# Patient Record
Sex: MALE | Race: OTHER | HISPANIC OR LATINO | URBAN - METROPOLITAN AREA
[De-identification: names, ages, dates, MRNs, and addresses within clinical notes are randomized per-mention and may not be internally consistent; named-entity substitution may affect disease eponyms.]

---

## 2019-03-21 ENCOUNTER — EMERGENCY (EMERGENCY)
Facility: HOSPITAL | Age: 64
LOS: 1 days | Discharge: HOME | End: 2019-03-21
Attending: EMERGENCY MEDICINE | Admitting: EMERGENCY MEDICINE
Payer: MEDICAID

## 2019-03-21 VITALS
HEART RATE: 62 BPM | DIASTOLIC BLOOD PRESSURE: 71 MMHG | RESPIRATION RATE: 18 BRPM | TEMPERATURE: 98 F | OXYGEN SATURATION: 98 % | SYSTOLIC BLOOD PRESSURE: 142 MMHG

## 2019-03-21 DIAGNOSIS — H11.31 CONJUNCTIVAL HEMORRHAGE, RIGHT EYE: ICD-10-CM

## 2019-03-21 DIAGNOSIS — R07.89 OTHER CHEST PAIN: ICD-10-CM

## 2019-03-21 DIAGNOSIS — R23.4 CHANGES IN SKIN TEXTURE: ICD-10-CM

## 2019-03-21 DIAGNOSIS — I10 ESSENTIAL (PRIMARY) HYPERTENSION: ICD-10-CM

## 2019-03-21 DIAGNOSIS — R51 HEADACHE: ICD-10-CM

## 2019-03-21 DIAGNOSIS — Z79.899 OTHER LONG TERM (CURRENT) DRUG THERAPY: ICD-10-CM

## 2019-03-21 LAB
ALBUMIN SERPL ELPH-MCNC: 3.3 G/DL — LOW (ref 3.4–5)
ALP SERPL-CCNC: 102 U/L — SIGNIFICANT CHANGE UP (ref 40–120)
ALT FLD-CCNC: 24 U/L — SIGNIFICANT CHANGE UP (ref 12–42)
ANION GAP SERPL CALC-SCNC: 7 MMOL/L — LOW (ref 9–16)
APPEARANCE UR: CLEAR — SIGNIFICANT CHANGE UP
AST SERPL-CCNC: 24 U/L — SIGNIFICANT CHANGE UP (ref 15–37)
BASOPHILS NFR BLD AUTO: 0.3 % — SIGNIFICANT CHANGE UP (ref 0–2)
BILIRUB SERPL-MCNC: 0.3 MG/DL — SIGNIFICANT CHANGE UP (ref 0.2–1.2)
BILIRUB UR-MCNC: NEGATIVE — SIGNIFICANT CHANGE UP
BUN SERPL-MCNC: 13 MG/DL — SIGNIFICANT CHANGE UP (ref 7–23)
CALCIUM SERPL-MCNC: 8.5 MG/DL — SIGNIFICANT CHANGE UP (ref 8.5–10.5)
CHLORIDE SERPL-SCNC: 106 MMOL/L — SIGNIFICANT CHANGE UP (ref 96–108)
CO2 SERPL-SCNC: 28 MMOL/L — SIGNIFICANT CHANGE UP (ref 22–31)
COLOR SPEC: YELLOW — SIGNIFICANT CHANGE UP
CREAT SERPL-MCNC: 1.05 MG/DL — SIGNIFICANT CHANGE UP (ref 0.5–1.3)
DIFF PNL FLD: NEGATIVE — SIGNIFICANT CHANGE UP
EOSINOPHIL NFR BLD AUTO: 1.3 % — SIGNIFICANT CHANGE UP (ref 0–6)
GLUCOSE SERPL-MCNC: 84 MG/DL — SIGNIFICANT CHANGE UP (ref 70–99)
GLUCOSE UR QL: NEGATIVE — SIGNIFICANT CHANGE UP
HCT VFR BLD CALC: 45.7 % — SIGNIFICANT CHANGE UP (ref 39–50)
HGB BLD-MCNC: 14.2 G/DL — SIGNIFICANT CHANGE UP (ref 13–17)
IMM GRANULOCYTES NFR BLD AUTO: 0.2 % — SIGNIFICANT CHANGE UP (ref 0–1.5)
KETONES UR-MCNC: NEGATIVE — SIGNIFICANT CHANGE UP
LEUKOCYTE ESTERASE UR-ACNC: NEGATIVE — SIGNIFICANT CHANGE UP
LIDOCAIN IGE QN: 98 U/L — SIGNIFICANT CHANGE UP (ref 73–393)
LYMPHOCYTES # BLD AUTO: 37.3 % — SIGNIFICANT CHANGE UP (ref 13–44)
MCHC RBC-ENTMCNC: 26.5 PG — LOW (ref 27–34)
MCHC RBC-ENTMCNC: 31.1 G/DL — LOW (ref 32–36)
MCV RBC AUTO: 85.4 FL — SIGNIFICANT CHANGE UP (ref 80–100)
MONOCYTES NFR BLD AUTO: 7.5 % — SIGNIFICANT CHANGE UP (ref 2–14)
NEUTROPHILS NFR BLD AUTO: 53.4 % — SIGNIFICANT CHANGE UP (ref 43–77)
NITRITE UR-MCNC: NEGATIVE — SIGNIFICANT CHANGE UP
PH UR: 7.5 — SIGNIFICANT CHANGE UP (ref 5–8)
PLATELET # BLD AUTO: 253 K/UL — SIGNIFICANT CHANGE UP (ref 150–400)
POTASSIUM SERPL-MCNC: 4.1 MMOL/L — SIGNIFICANT CHANGE UP (ref 3.5–5.3)
POTASSIUM SERPL-SCNC: 4.1 MMOL/L — SIGNIFICANT CHANGE UP (ref 3.5–5.3)
PROT SERPL-MCNC: 7.8 G/DL — SIGNIFICANT CHANGE UP (ref 6.4–8.2)
PROT UR-MCNC: NEGATIVE MG/DL — SIGNIFICANT CHANGE UP
RBC # BLD: 5.35 M/UL — SIGNIFICANT CHANGE UP (ref 4.2–5.8)
RBC # FLD: 13.5 % — SIGNIFICANT CHANGE UP (ref 10.3–14.5)
SODIUM SERPL-SCNC: 141 MMOL/L — SIGNIFICANT CHANGE UP (ref 132–145)
SP GR SPEC: 1.01 — SIGNIFICANT CHANGE UP (ref 1–1.03)
TROPONIN I SERPL-MCNC: <0.017 NG/ML — LOW (ref 0.02–0.06)
UROBILINOGEN FLD QL: 0.2 E.U./DL — SIGNIFICANT CHANGE UP
WBC # BLD: 6 K/UL — SIGNIFICANT CHANGE UP (ref 3.8–10.5)
WBC # FLD AUTO: 6 K/UL — SIGNIFICANT CHANGE UP (ref 3.8–10.5)

## 2019-03-21 PROCEDURE — 99284 EMERGENCY DEPT VISIT MOD MDM: CPT | Mod: 25

## 2019-03-21 PROCEDURE — 70450 CT HEAD/BRAIN W/O DYE: CPT | Mod: 26

## 2019-03-21 PROCEDURE — 93010 ELECTROCARDIOGRAM REPORT: CPT

## 2019-03-21 NOTE — ED ADULT NURSE NOTE - NSIMPLEMENTINTERV_GEN_ALL_ED
Implemented All Universal Safety Interventions:  [place] to call system. Call bell, personal items and telephone within reach. Instruct patient to call for assistance. Room bathroom lighting operational. Non-slip footwear when patient is off stretcher. Physically safe environment: no spills, clutter or unnecessary equipment. Stretcher in lowest position, wheels locked, appropriate side rails in place.

## 2019-03-21 NOTE — ED PROVIDER NOTE - PROGRESS NOTE DETAILS
[facility] - discussed results with patient. will dc home with close follow up. patient aware and agrees.

## 2019-03-21 NOTE — ED PROVIDER NOTE - CLINICAL SUMMARY MEDICAL DECISION MAKING FREE TEXT BOX
64 y/o male with h/o HTN, presents with chest pressure and concern for high BP and HA. Pt appears well, unlikely ACS or ICH, however will send labs, troponin, CT head, EKG, and reassess. If normal workup, pt safe for outpatient follow-up in 3 days. Patient aware and agrees.

## 2019-03-21 NOTE — ED PROVIDER NOTE - SKIN, MLM
Left anterior tibia with 3cm area of dry scaly skin. Otherwise skin normal color for race, warm, dry and intact.

## 2019-03-21 NOTE — ED PROVIDER NOTE - ATTENDING CONTRIBUTION TO CARE
63 yom pw htn, hx of htn, recently increased norvasc from 5 to 10, still not well controlled, noted subconj hemorrhage, had seen by ophthalmologist as well.  states last night had chest heaviness, also headache.  no back pain, no nv, no sob.  no paresthesia/weakness.    agree w/ resident, ao x 3, clear speech, full eomi, perrl, rrr, cta bl, will check screening ekg, ct head given pt w/ htn w/ ha and on asa, labs eval for renal function vs ua for protein, reassess    instructed pt to f/u pmd regarding possibly adding 2nd agent for htn

## 2019-03-21 NOTE — ED PROVIDER NOTE - OBJECTIVE STATEMENT
62 y/o male with PMHx of HTN (takes amlodipine), NKDA, presents to the ED c/o high BP and headache. He reports taking his BP at home at 3am this morning, pressure recorded at 188/94. Pt notes his PCP recently increased his dose of amlodipine from 5mg to 10mg to further control his BP, but the change had minimal improvement. He admits to chest pressure, HA, and 3 weeks of right eye redness. Pt saw an ophthalmologist 2 wks ago for his eye redness where his eval was normal and was given eyedrops. He denies blurry vision, no diplopia, no weakness, no sob, no CP. To note, a virtual  was used to obtain history,  ID#[id number].

## 2019-03-21 NOTE — ED PROVIDER NOTE - NSFOLLOWUPINSTRUCTIONS_ED_ALL_ED_FT
1) Follow up with your doctor in 1 week. Call for an appointment.   2) Return to the ER immediately for new or worsening symptoms including chest pain, weakness or other issues.   3) Take all your home medications as prescribed.   4) Talk to your doctor about starting another medicine - hydrochlorothiazide.

## 2019-03-21 NOTE — ED ADULT TRIAGE NOTE - CHIEF COMPLAINT QUOTE
pt states his home BP machine has been reading high, 187/94. takes asa and amlodipine.  States he has a small headache and right eye has burst capillary x 3 weeks.

## 2019-04-03 ENCOUNTER — APPOINTMENT (OUTPATIENT)
Dept: UROLOGY | Facility: CLINIC | Age: 64
End: 2019-04-03

## 2019-11-16 ENCOUNTER — EMERGENCY (EMERGENCY)
Facility: HOSPITAL | Age: 64
LOS: 1 days | Discharge: HOME | End: 2019-11-16
Admitting: EMERGENCY MEDICINE
Payer: MEDICAID

## 2019-11-16 VITALS
TEMPERATURE: 98 F | HEIGHT: 69 IN | OXYGEN SATURATION: 98 % | SYSTOLIC BLOOD PRESSURE: 160 MMHG | HEART RATE: 60 BPM | WEIGHT: 190.04 LBS | RESPIRATION RATE: 16 BRPM | DIASTOLIC BLOOD PRESSURE: 84 MMHG

## 2019-11-16 PROCEDURE — 99283 EMERGENCY DEPT VISIT LOW MDM: CPT

## 2019-11-16 PROCEDURE — 73660 X-RAY EXAM OF TOE(S): CPT | Mod: 26,LT

## 2019-11-16 RX ORDER — CEPHALEXIN 500 MG
1 CAPSULE ORAL
Qty: 28 | Refills: 0
Start: 2019-11-16 | End: 2019-11-22

## 2019-11-16 NOTE — ED ADULT TRIAGE NOTE - CHIEF COMPLAINT QUOTE
ambulatory accompanied by daughter complaining of pain and black discoloration to left fifth toe. States he wants to make sure it is not infected. Pt is Samoan speaking.

## 2019-11-16 NOTE — ED PROVIDER NOTE - PATIENT PORTAL LINK FT
You can access the FollowMyHealth Patient Portal offered by [facility] by registering at the following website: [email or web address]. By joining [employer]’s FollowMyHealth portal, you will also be able to view your health information using other applications (apps) compatible with our system.

## 2019-11-16 NOTE — ED PROVIDER NOTE - NSFOLLOWUPINSTRUCTIONS_ED_ALL_ED_FT
PLEASE TAKE 1 TAB CEPHALEXIN 500MG BY MOUTH FOUR TIMES A DAY FOR 7 DAYS.    PLEASE FOLLOW UP WITH EITHER DR. ADAME (PODIATRIST) OR WITH THE PODIATRY CLINIC LISTED HERE WITHIN 1-3 DAYS AS DISCUSSED.    PLEASE RETURN TO THE ER IMMEDIATELY OR CALL 911 FOR ANY HIGH FEVER, CHILLS, CHEST PAIN, TROUBLE BREATHING, SEVERE PAIN, TOE NUMBNESS, TOE WEAKNESS, BLEEDING, DISCHARGE, FOUL ODOR, OR ANY OTHER CONCERNING SIGNS OR SYMPTOMS.

## 2019-11-16 NOTE — ED PROVIDER NOTE - SKIN WOUND TYPE
+dry, blackened gangrene to the distal tip of the left 5th toe. Nontender. No swelling, erythema, warmth, streaking, fluctuance, crepitus, bullae, open wounds, bleeding or discharge. +dry, darkened skin discoloration to the distal tip of the left 5th toe resembling probable ecchymosis, less likely dry gangrene. Nontender. No swelling, erythema, warmth, streaking, fluctuance, crepitus, bullae, open wounds, bleeding or discharge.

## 2019-11-16 NOTE — ED ADULT NURSE NOTE - OBJECTIVE STATEMENT
65 yo M c/o change in toe color. Pt denies pain, states that he noticed his toenail looked black and he was unsure why. Pt is unsure of trauma, states he may have dropped a box on his toe at work but does not remember. Pt is asymptomatic at this time. Hx of htn taking losartan, denies all other hx. Pt speaking in full complete sentences, resting comfortably in NAD with family at bedside.

## 2019-11-16 NOTE — ED PROVIDER NOTE - CARE PROVIDER_API CALL
Young Trujillo (DPM)  Podiatric Medicine and Surgery  [address], [address]  [place] [address]  Phone: [phone]  Fax: [phone]  Follow Up Time: 1-3 Days

## 2019-11-16 NOTE — ED ADULT NURSE NOTE - CHIEF COMPLAINT QUOTE
ambulatory accompanied by daughter complaining of pain and black discoloration to left fifth toe. States he wants to make sure it is not infected. Pt is [place] Island[place] speaking.

## 2019-11-16 NOTE — ED ADULT NURSE NOTE - CHPI ED NUR SYMPTOMS NEG
no nausea/no tingling/no dizziness/no fever/no chills/no decreased eating/drinking/no vomiting/no weakness/no pain

## 2019-11-16 NOTE — ED ADULT NURSE NOTE - NSIMPLEMENTINTERV_GEN_ALL_ED
Implemented All Universal Safety Interventions:  [place] to call system. Call bell, personal items and telephone within reach. Instruct patient to call for assistance. Room bathroom lighting operational. Non-slip footwear when patient is off stretcher. Physically safe environment: no spills, clutter or unnecessary equipment. Stretcher in lowest position, wheels locked, appropriate side rails in place.

## 2019-11-16 NOTE — ED PROVIDER NOTE - OBJECTIVE STATEMENT
65 y/o M with PMH of HTN presents to the ED c/o discoloration to his left 5th toe which he first noticed today. 63 y/o M with PMH of HTN presents to the ED c/o black discoloration to his left 5th toe which he first noticed today, though states he is unsure how long the toe has been discolored. He denies having any pain to the toe, despite the triage note, and states he has no other associated sx's or acute medical complaints at this time. He denies any recent injury. No acute numbness or weakness. No fever, chills, foot pain, foul odor, bleeding or discharge from the wound, His accompanying daughter states the pt had an unspecified infection of his entire LLE 3 years ago which required "surgery to remove the infection" at another unknown ED 3 years ago.

## 2019-11-16 NOTE — ED PROVIDER NOTE - CLINICAL SUMMARY MEDICAL DECISION MAKING FREE TEXT BOX
Clinical presentation is c/w probably ecchymosis of left 5th toe; less likely dry gangrene, not acute. No fever, chills or other associated sx's. No acute, concerning findings on xrays. Will prescribe Keflex and have pt F/U with Podiatry in 1-3 days. Strict return precautions reviewed with pt in which pt verbalizes understanding and agrees to.

## 2019-11-16 NOTE — ED PROVIDER NOTE - NSFOLLOWUPCLINICS_GEN_ALL_ED_FT
[facility]  Podiatry  [address] [place] [address]  Phone: [phone]  Fax:   Follow Up Time: 1-3 Days

## 2019-11-21 DIAGNOSIS — M79.675 PAIN IN LEFT TOE(S): ICD-10-CM

## 2019-11-21 DIAGNOSIS — M79.81 NONTRAUMATIC HEMATOMA OF SOFT TISSUE: ICD-10-CM

## 2020-01-14 ENCOUNTER — EMERGENCY (EMERGENCY)
Facility: HOSPITAL | Age: 65
LOS: 1 days | Discharge: HOME | End: 2020-01-14
Admitting: EMERGENCY MEDICINE
Payer: MEDICAID

## 2020-01-14 VITALS
HEART RATE: 53 BPM | SYSTOLIC BLOOD PRESSURE: 148 MMHG | TEMPERATURE: 98 F | RESPIRATION RATE: 16 BRPM | DIASTOLIC BLOOD PRESSURE: 79 MMHG | OXYGEN SATURATION: 100 %

## 2020-01-14 VITALS
HEART RATE: 56 BPM | OXYGEN SATURATION: 98 % | TEMPERATURE: 97 F | HEIGHT: 68 IN | DIASTOLIC BLOOD PRESSURE: 72 MMHG | WEIGHT: 192.02 LBS | SYSTOLIC BLOOD PRESSURE: 128 MMHG | RESPIRATION RATE: 17 BRPM

## 2020-01-14 LAB
ALBUMIN SERPL ELPH-MCNC: 3.5 G/DL — SIGNIFICANT CHANGE UP (ref 3.4–5)
ALP SERPL-CCNC: 115 U/L — SIGNIFICANT CHANGE UP (ref 40–120)
ALT FLD-CCNC: 27 U/L — SIGNIFICANT CHANGE UP (ref 12–42)
ANION GAP SERPL CALC-SCNC: 7 MMOL/L — LOW (ref 9–16)
APPEARANCE UR: CLEAR — SIGNIFICANT CHANGE UP
AST SERPL-CCNC: 27 U/L — SIGNIFICANT CHANGE UP (ref 15–37)
BASOPHILS # BLD AUTO: 0.04 K/UL — SIGNIFICANT CHANGE UP (ref 0–0.2)
BASOPHILS NFR BLD AUTO: 0.5 % — SIGNIFICANT CHANGE UP (ref 0–2)
BILIRUB SERPL-MCNC: 0.2 MG/DL — SIGNIFICANT CHANGE UP (ref 0.2–1.2)
BILIRUB UR-MCNC: NEGATIVE — SIGNIFICANT CHANGE UP
BUN SERPL-MCNC: 15 MG/DL — SIGNIFICANT CHANGE UP (ref 7–23)
CALCIUM SERPL-MCNC: 8.7 MG/DL — SIGNIFICANT CHANGE UP (ref 8.5–10.5)
CHLORIDE SERPL-SCNC: 104 MMOL/L — SIGNIFICANT CHANGE UP (ref 96–108)
CO2 SERPL-SCNC: 28 MMOL/L — SIGNIFICANT CHANGE UP (ref 22–31)
COLOR SPEC: YELLOW — SIGNIFICANT CHANGE UP
CREAT SERPL-MCNC: 1.05 MG/DL — SIGNIFICANT CHANGE UP (ref 0.5–1.3)
DIFF PNL FLD: NEGATIVE — SIGNIFICANT CHANGE UP
EOSINOPHIL # BLD AUTO: 0.11 K/UL — SIGNIFICANT CHANGE UP (ref 0–0.5)
EOSINOPHIL NFR BLD AUTO: 1.4 % — SIGNIFICANT CHANGE UP (ref 0–6)
GLUCOSE SERPL-MCNC: 119 MG/DL — HIGH (ref 70–99)
GLUCOSE UR QL: NEGATIVE — SIGNIFICANT CHANGE UP
HCT VFR BLD CALC: 44.3 % — SIGNIFICANT CHANGE UP (ref 39–50)
HGB BLD-MCNC: 14 G/DL — SIGNIFICANT CHANGE UP (ref 13–17)
IMM GRANULOCYTES NFR BLD AUTO: 0.3 % — SIGNIFICANT CHANGE UP (ref 0–1.5)
KETONES UR-MCNC: NEGATIVE — SIGNIFICANT CHANGE UP
LEUKOCYTE ESTERASE UR-ACNC: NEGATIVE — SIGNIFICANT CHANGE UP
LYMPHOCYTES # BLD AUTO: 3.05 K/UL — SIGNIFICANT CHANGE UP (ref 1–3.3)
LYMPHOCYTES # BLD AUTO: 39.2 % — SIGNIFICANT CHANGE UP (ref 13–44)
MAGNESIUM SERPL-MCNC: 2 MG/DL — SIGNIFICANT CHANGE UP (ref 1.6–2.6)
MCHC RBC-ENTMCNC: 26.6 PG — LOW (ref 27–34)
MCHC RBC-ENTMCNC: 31.6 GM/DL — LOW (ref 32–36)
MCV RBC AUTO: 84.2 FL — SIGNIFICANT CHANGE UP (ref 80–100)
MONOCYTES # BLD AUTO: 0.51 K/UL — SIGNIFICANT CHANGE UP (ref 0–0.9)
MONOCYTES NFR BLD AUTO: 6.5 % — SIGNIFICANT CHANGE UP (ref 2–14)
NEUTROPHILS # BLD AUTO: 4.06 K/UL — SIGNIFICANT CHANGE UP (ref 1.8–7.4)
NEUTROPHILS NFR BLD AUTO: 52.1 % — SIGNIFICANT CHANGE UP (ref 43–77)
NITRITE UR-MCNC: NEGATIVE — SIGNIFICANT CHANGE UP
NRBC # BLD: 0 /100 WBCS — SIGNIFICANT CHANGE UP (ref 0–0)
PH UR: 7 — SIGNIFICANT CHANGE UP (ref 5–8)
PLATELET # BLD AUTO: 260 K/UL — SIGNIFICANT CHANGE UP (ref 150–400)
POTASSIUM SERPL-MCNC: 3.8 MMOL/L — SIGNIFICANT CHANGE UP (ref 3.5–5.3)
POTASSIUM SERPL-SCNC: 3.8 MMOL/L — SIGNIFICANT CHANGE UP (ref 3.5–5.3)
PROT SERPL-MCNC: 8 G/DL — SIGNIFICANT CHANGE UP (ref 6.4–8.2)
PROT UR-MCNC: NEGATIVE MG/DL — SIGNIFICANT CHANGE UP
RBC # BLD: 5.26 M/UL — SIGNIFICANT CHANGE UP (ref 4.2–5.8)
RBC # FLD: 13.9 % — SIGNIFICANT CHANGE UP (ref 10.3–14.5)
SODIUM SERPL-SCNC: 139 MMOL/L — SIGNIFICANT CHANGE UP (ref 132–145)
SP GR SPEC: 1.02 — SIGNIFICANT CHANGE UP (ref 1–1.03)
TROPONIN I SERPL-MCNC: <0.017 NG/ML — LOW (ref 0.02–0.06)
UROBILINOGEN FLD QL: 0.2 E.U./DL — SIGNIFICANT CHANGE UP
WBC # BLD: 7.79 K/UL — SIGNIFICANT CHANGE UP (ref 3.8–10.5)
WBC # FLD AUTO: 7.79 K/UL — SIGNIFICANT CHANGE UP (ref 3.8–10.5)

## 2020-01-14 PROCEDURE — 70450 CT HEAD/BRAIN W/O DYE: CPT | Mod: 26

## 2020-01-14 PROCEDURE — 93010 ELECTROCARDIOGRAM REPORT: CPT

## 2020-01-14 PROCEDURE — 99284 EMERGENCY DEPT VISIT MOD MDM: CPT

## 2020-01-14 RX ORDER — ACETAMINOPHEN 500 MG
650 TABLET ORAL ONCE
Refills: 0 | Status: COMPLETED | OUTPATIENT
Start: 2020-01-14 | End: 2020-01-14

## 2020-01-14 RX ADMIN — Medication 650 MILLIGRAM(S): at 19:30

## 2020-01-14 NOTE — ED PROVIDER NOTE - PHYSICAL EXAMINATION
VITAL SIGNS: I have reviewed nursing notes and confirm.  CONSTITUTIONAL: Well-developed; well-nourished; in no acute distress.  SKIN: Skin is warm and dry, no acute rash.  HEAD: Normocephalic; atraumatic.  EYES: PERRL, EOM intact; conjunctiva and sclera clear.  ENT: No nasal discharge; airway clear.  NECK: Supple; non tender.  CARD: No murmurs, gallops, or rubs. Regular rate and rhythm.  RESP: No wheezes, rales or rhonchi.  EXT: Normal ROM. No clubbing, cyanosis or edema.  NEURO: Alert, oriented. Grossly unremarkable.  PSYCH: Cooperative, appropriate.

## 2020-01-14 NOTE — ED ADULT TRIAGE NOTE - CHIEF COMPLAINT QUOTE
Pt presents to ED with c/o high blood pressure with accompanying headache x 2 weeks, denies dizziness or chest pressure, took amlodipine - /72 now, CN II-XII grossly intact

## 2020-01-14 NOTE — ED ADULT NURSE NOTE - NSIMPLEMENTINTERV_GEN_ALL_ED
Implemented All Universal Safety Interventions:  [place] to call system. Call bell, personal items and telephone within reach. Instruct patient to call for assistance. Room bathroom lighting operational. Non-slip footwear when patient is off stretcher. Physically safe environment: no spills, clutter or unnecessary equipment. Stretcher in lowest position, wheels locked, appropriate side rails in place.

## 2020-01-14 NOTE — ED PROVIDER NOTE - NS ED ROS FT
Other than symptoms associated with present events the following is reported:  General:  No fever, no chills, no weight loss.  HEENT:  No sore throat.  Respiratory: No cough, no dyspnea, no wheeze.  Cardiovascular:  No chest pain, no palpitations, no orthopnea.  GI: No abdominal pain, no nausea/vomiting, no diarrhea.  Musculoskeletal:  No joint pain, no myalgia.  Endocrine:  No generalized weakness, no polyuria.  Neurological:  +Headache, no focal weakness.   Psychiatric: No emotional stress, no depression.  Derm:  No rash.  Heme:  No bruising, no bleeding.

## 2020-01-14 NOTE — ED ADULT NURSE NOTE - CHPI ED NUR SYMPTOMS NEG
no change in level of consciousness/no blurred vision/no confusion/no loss of consciousness/no vomiting/no weakness/no dizziness/no fever/no nausea/no numbness

## 2020-01-14 NOTE — ED PROVIDER NOTE - PATIENT PORTAL LINK FT
You can access the FollowMyHealth Patient Portal offered by [facility] by registering at the following website: [email or web address]. By joining [employer]’s FollowMyHealth portal, you will also be able to view your health information using other applications (apps) compatible with our system.

## 2020-01-14 NOTE — ED PROVIDER NOTE - PROGRESS NOTE DETAILS
labs and CT reviewed. no further symptoms.  headache resolved. discussed hydration. neuro intact AFVSS at time of d/c, pt non-toxic appearing, results, ddx, and f/u plans discussed with pt at bedside, d/c'd home to f/u with PMD, strict return precautions discussed, prompt return to ER for any worsening or new sx, pt verbalized understanding.

## 2020-01-14 NOTE — ED ADULT NURSE NOTE - OBJECTIVE STATEMENT
65 yo M c.o headache. Pt states "I have had a headache for 2 weeks that is on and off and feels like it is pulsing". Pt states pain is in the frontal and occipital portion of head. Pt states he is compliant with BP medication and takes in every morning as directed. Pt is A&Ox3 at this time denies lightheadedness, dizziness, blurred vision, numbness or tingling to bl upper and lower extremities, nausea, vomiting, fever, chills, CP, cough at this time.

## 2020-01-14 NOTE — ED PROVIDER NOTE - OBJECTIVE STATEMENT
64 y o male with PMHx of HTN (Amlodipine 10mg) presents to ED c/o headache x2 weeks. Last checked his BP three days ago and read 148/95. Pt is compliant with his meds, takes his medication daily. States that his Amlodipine dose was recently increased from 5mg to 10mg about seven months ago. No syncope, paresthesias, chest pain, blurred vision, N/V, or other sx reported.

## 2020-01-20 DIAGNOSIS — R51 HEADACHE: ICD-10-CM

## 2020-03-22 ENCOUNTER — EMERGENCY (EMERGENCY)
Facility: HOSPITAL | Age: 65
LOS: 1 days | Discharge: HOME | End: 2020-03-22
Admitting: EMERGENCY MEDICINE
Payer: MEDICAID

## 2020-03-22 VITALS
RESPIRATION RATE: 15 BRPM | HEIGHT: 67 IN | OXYGEN SATURATION: 97 % | DIASTOLIC BLOOD PRESSURE: 70 MMHG | SYSTOLIC BLOOD PRESSURE: 132 MMHG | WEIGHT: 190.04 LBS | TEMPERATURE: 103 F | HEART RATE: 86 BPM

## 2020-03-22 DIAGNOSIS — R21 RASH AND OTHER NONSPECIFIC SKIN ERUPTION: ICD-10-CM

## 2020-03-22 DIAGNOSIS — Z79.2 LONG TERM (CURRENT) USE OF ANTIBIOTICS: ICD-10-CM

## 2020-03-22 DIAGNOSIS — J06.9 ACUTE UPPER RESPIRATORY INFECTION, UNSPECIFIED: ICD-10-CM

## 2020-03-22 DIAGNOSIS — I10 ESSENTIAL (PRIMARY) HYPERTENSION: ICD-10-CM

## 2020-03-22 DIAGNOSIS — R63.0 ANOREXIA: ICD-10-CM

## 2020-03-22 DIAGNOSIS — R50.9 FEVER, UNSPECIFIED: ICD-10-CM

## 2020-03-22 LAB
FLU A RESULT: SIGNIFICANT CHANGE UP
FLU A RESULT: SIGNIFICANT CHANGE UP
FLUAV AG NPH QL: SIGNIFICANT CHANGE UP
FLUBV AG NPH QL: SIGNIFICANT CHANGE UP
RSV RESULT: SIGNIFICANT CHANGE UP
RSV RNA RESP QL NAA+PROBE: SIGNIFICANT CHANGE UP

## 2020-03-22 PROCEDURE — 99284 EMERGENCY DEPT VISIT MOD MDM: CPT

## 2020-03-22 RX ORDER — ACETAMINOPHEN 500 MG
975 TABLET ORAL ONCE
Refills: 0 | Status: COMPLETED | OUTPATIENT
Start: 2020-03-22 | End: 2020-03-22

## 2020-03-22 RX ADMIN — Medication 975 MILLIGRAM(S): at 19:29

## 2020-03-22 NOTE — ED PROVIDER NOTE - NSFOLLOWUPINSTRUCTIONS_ED_ALL_ED_FT
Hydrate well and rest.    Take Tylenol with food for pain and fever.    Quarantine for 14 days.    Return for shortness of breath and difficulty in breathing.

## 2020-03-22 NOTE — ED PROVIDER NOTE - CLINICAL SUMMARY MEDICAL DECISION MAKING FREE TEXT BOX
63 y/o M presents to ED c/o fever, cough.  Pt well appearing, febrile, normotensive.  Pt tested for COVID19, Influenza and rvp.  Pt advised to quarantine for 14 days and supportive treatment.  Return precautions advised.

## 2020-03-22 NOTE — ED PROVIDER NOTE - OBJECTIVE STATEMENT
fever 5 days  rash last night very itchy initially but the itchiness has since resolved   back pain  htn   1 episode of bandar resolved  dec appetite  no sob, throat pain, cp, vom, bandar sick contact cough   lives with wife and daughter who are well 63 yo M w/ PMHx of HTN presents to the ED c/o fever, decreased appetite x 5 days and rash to back since last night. Pt notes rash was very itchy initially but the itchiness has since resolved; pt also notes 1 episode of diarrhea this morning, which has since resolved. Denies SOB, throat pain, chest pain, nausea, vomiting, diarrhea, cough, or sick contacts. Pt lives with his wife and daughter who are well with no complaints. 63 yo M w/ PMHx of HTN presents to the ED c/o fever, decreased appetite x 5 days and rash to back since last night. Pt notes rash was very itchy initially but the itchiness has since resolved; pt also notes 1 episode of diarrhea this morning, which has since resolved. Denies SOB, throat pain, chest pain, nausea, vomiting, diarrhea, cough, or sick contacts, or travels.  He has been taking Tylenol with relief of fever.  Pt lives with his wife and daughter who are well with no complaints.

## 2020-03-22 NOTE — ED PROVIDER NOTE - PATIENT PORTAL LINK FT
You can access the FollowMyHealth Patient Portal offered by [facility] by registering at the following website: [email or web address]. By joining [employer]’s FollowMyHealth portal, you will also be able to view your health information using other applications (apps) compatible with our system.

## 2020-03-22 NOTE — ED ADULT NURSE NOTE - OBJECTIVE STATEMENT
Patient to ED c/o fever, decreased appetite x 5 days and rash to back since last night. Pt notes rash was very itchy initially but the itchiness has since resolved; pt also notes 1 episode of diarrhea this morning, which has since resolved. Denies SOB, throat pain, chest pain, nausea, vomiting, diarrhea, cough, or sick contacts. Pt lives with his wife and daughter who are well with no complaints.

## 2020-03-23 PROBLEM — I10 ESSENTIAL (PRIMARY) HYPERTENSION: Chronic | Status: ACTIVE | Noted: 2020-01-14

## 2020-03-23 LAB — RAPID RVP RESULT: SIGNIFICANT CHANGE UP

## 2020-03-24 LAB — SARS-COV-2 RNA SPEC QL NAA+PROBE: DETECTED

## 2020-09-02 ENCOUNTER — APPOINTMENT (OUTPATIENT)
Dept: HEART AND VASCULAR | Facility: CLINIC | Age: 65
End: 2020-09-02
Payer: MEDICARE

## 2020-09-02 VITALS — DIASTOLIC BLOOD PRESSURE: 90 MMHG | SYSTOLIC BLOOD PRESSURE: 150 MMHG | HEART RATE: 73 BPM

## 2020-09-02 DIAGNOSIS — I10 ESSENTIAL (PRIMARY) HYPERTENSION: ICD-10-CM

## 2020-09-02 PROCEDURE — 99214 OFFICE O/P EST MOD 30 MIN: CPT

## 2020-09-02 RX ORDER — BLOOD-GLUCOSE METER
KIT MISCELLANEOUS
Qty: 1 | Refills: 0 | Status: ACTIVE | COMMUNITY
Start: 2020-09-02 | End: 1900-01-01

## 2020-09-02 RX ORDER — HYDROCHLOROTHIAZIDE 12.5 MG/1
12.5 CAPSULE ORAL DAILY
Qty: 30 | Refills: 3 | Status: ACTIVE | COMMUNITY
Start: 2020-09-02 | End: 1900-01-01

## 2020-09-02 NOTE — HISTORY OF PRESENT ILLNESS
[FreeTextEntry1] : 65-year-old man with past medical history of hypertension here for first evaluation\par The patient reports LE edema in the last few months. No orthopnea, but reports dyspnea on exertion after a few blocks. Denies chest pain, palpitations, syncope, pre-syncope. \par Reports long h/o HTN for which he is taking amlodipine 10 mg. \par Reports he was prescribed hydrochlorothiazide to by his primary M.D. within minimal improvement of his lower extremity edema. He is currently stopped taking hydrochlorothiazide. Continues to take amlodipine\par \par \par PMH \par HTN\par \par ALL\par NKDA\par \par MEDS\par amlodipine 10 mg daily \par \par FH\par family h/o HTN\par \par SH \par no smoke, no etoh, no drugs\par \par Labs 8/5/020\par Creatinine 1.07\par AST ALT within normal limits\par Hemoglobin A1c 5.8\par Cholesterol 178\par HDL 45\par Triglycerides 107\par \par \par EKG 9/2/2020\par Sinus bradycardia heart rate 52\par \par

## 2020-09-02 NOTE — PHYSICAL EXAM
[Normal Appearance] : normal appearance [General Appearance - Well Developed] : well developed [General Appearance - Well Nourished] : well nourished [Well Groomed] : well groomed [No Deformities] : no deformities [Normal Oral Mucosa] : normal oral mucosa [General Appearance - In No Acute Distress] : no acute distress [No Oral Cyanosis] : no oral cyanosis [No Oral Pallor] : no oral pallor [Normal Jugular Venous A Waves Present] : normal jugular venous A waves present [Heart Rate And Rhythm] : heart rate and rhythm were normal [Normal Jugular Venous V Waves Present] : normal jugular venous V waves present [No Jugular Venous Leblanc A Waves] : no jugular venous leblanc A waves [Heart Sounds] : normal S1 and S2 [Murmurs] : no murmurs present [Exaggerated Use Of Accessory Muscles For Inspiration] : no accessory muscle use [Auscultation Breath Sounds / Voice Sounds] : lungs were clear to auscultation bilaterally [Respiration, Rhythm And Depth] : normal respiratory rhythm and effort [Abdomen Soft] : soft [Abdomen Tenderness] : non-tender [Abdomen Mass (___ Cm)] : no abdominal mass palpated [] : no hepato-splenomegaly [FreeTextEntry1] : trace edema b/l

## 2020-09-02 NOTE — ASSESSMENT
[FreeTextEntry1] : 65-year-old man with past medical history of hypertension here for first evaluation\par \par LE edema\par -echo to r/o any WMA\par -consider also possible side effect of amlodipine \par -stop amlodipine, start Lisinopril and hydrochlorothiazide in the setting of uncontrolled hypertension\par -Renal and hepatic panel wnl\par \par ESTRADA\par -etioloyg is broad\par -echo to r/o wma\par -consider holter in setting of bradycardia on EKG\par -labs wnl\par -BP control as below\par \par HTN\par -Uncontrolled\par -Echocardiogram\par -Stop amlodipine in the settiing of lower extremity edema\par -Start lisinopril 10 mg daily\par -Start hydrochlorothiazide 12.5 mg\par -Recommend blood pressure check at home and keep blood pressure log\par \par HLD\par -Lipid panel reviewed\par -Reccomend healthy diet and exercise\par \par BRADYCARDIA\par -sinus bradycardia on EKG, patient denies dizziness, syncope or presycnope\par -echo as above\par \par Followup in 2 weeks for echo reults  and blood pressure check

## 2020-09-02 NOTE — REVIEW OF SYSTEMS
[Lower Ext Edema] : lower extremity edema [Dyspnea on exertion] : dyspnea during exertion [Negative] : Neurological

## 2020-09-18 ENCOUNTER — EMERGENCY (EMERGENCY)
Facility: HOSPITAL | Age: 65
LOS: 1 days | Discharge: HOME | End: 2020-09-18
Attending: EMERGENCY MEDICINE | Admitting: EMERGENCY MEDICINE
Payer: MEDICARE

## 2020-09-18 VITALS
SYSTOLIC BLOOD PRESSURE: 147 MMHG | HEIGHT: 67 IN | TEMPERATURE: 98 F | RESPIRATION RATE: 18 BRPM | DIASTOLIC BLOOD PRESSURE: 80 MMHG | OXYGEN SATURATION: 99 % | WEIGHT: 201.94 LBS | HEART RATE: 89 BPM

## 2020-09-18 VITALS — TEMPERATURE: 99 F

## 2020-09-18 DIAGNOSIS — M54.5 LOW BACK PAIN: ICD-10-CM

## 2020-09-18 LAB
ALBUMIN SERPL ELPH-MCNC: 3.7 G/DL — SIGNIFICANT CHANGE UP (ref 3.4–5)
ALP SERPL-CCNC: 103 U/L — SIGNIFICANT CHANGE UP (ref 40–120)
ALT FLD-CCNC: 24 U/L — SIGNIFICANT CHANGE UP (ref 12–42)
ANION GAP SERPL CALC-SCNC: 6 MMOL/L — LOW (ref 9–16)
APPEARANCE UR: CLEAR — SIGNIFICANT CHANGE UP
AST SERPL-CCNC: 29 U/L — SIGNIFICANT CHANGE UP (ref 15–37)
BASOPHILS # BLD AUTO: 0.03 K/UL — SIGNIFICANT CHANGE UP (ref 0–0.2)
BASOPHILS NFR BLD AUTO: 0.4 % — SIGNIFICANT CHANGE UP (ref 0–2)
BILIRUB SERPL-MCNC: 0.4 MG/DL — SIGNIFICANT CHANGE UP (ref 0.2–1.2)
BILIRUB UR-MCNC: NEGATIVE — SIGNIFICANT CHANGE UP
BUN SERPL-MCNC: 13 MG/DL — SIGNIFICANT CHANGE UP (ref 7–23)
CALCIUM SERPL-MCNC: 9.1 MG/DL — SIGNIFICANT CHANGE UP (ref 8.5–10.5)
CHLORIDE SERPL-SCNC: 107 MMOL/L — SIGNIFICANT CHANGE UP (ref 96–108)
CO2 SERPL-SCNC: 31 MMOL/L — SIGNIFICANT CHANGE UP (ref 22–31)
COLOR SPEC: YELLOW — SIGNIFICANT CHANGE UP
CREAT SERPL-MCNC: 1.19 MG/DL — SIGNIFICANT CHANGE UP (ref 0.5–1.3)
DIFF PNL FLD: NEGATIVE — SIGNIFICANT CHANGE UP
EOSINOPHIL # BLD AUTO: 0.07 K/UL — SIGNIFICANT CHANGE UP (ref 0–0.5)
EOSINOPHIL NFR BLD AUTO: 0.8 % — SIGNIFICANT CHANGE UP (ref 0–6)
GLUCOSE SERPL-MCNC: 98 MG/DL — SIGNIFICANT CHANGE UP (ref 70–99)
GLUCOSE UR QL: NEGATIVE — SIGNIFICANT CHANGE UP
HCT VFR BLD CALC: 46.2 % — SIGNIFICANT CHANGE UP (ref 39–50)
HGB BLD-MCNC: 14.2 G/DL — SIGNIFICANT CHANGE UP (ref 13–17)
IMM GRANULOCYTES NFR BLD AUTO: 0.4 % — SIGNIFICANT CHANGE UP (ref 0–1.5)
KETONES UR-MCNC: NEGATIVE — SIGNIFICANT CHANGE UP
LACTATE SERPL-SCNC: 0.5 MMOL/L — SIGNIFICANT CHANGE UP (ref 0.4–2)
LEUKOCYTE ESTERASE UR-ACNC: NEGATIVE — SIGNIFICANT CHANGE UP
LIDOCAIN IGE QN: 94 U/L — SIGNIFICANT CHANGE UP (ref 73–393)
LYMPHOCYTES # BLD AUTO: 3.52 K/UL — HIGH (ref 1–3.3)
LYMPHOCYTES # BLD AUTO: 42.3 % — SIGNIFICANT CHANGE UP (ref 13–44)
MCHC RBC-ENTMCNC: 26.2 PG — LOW (ref 27–34)
MCHC RBC-ENTMCNC: 30.7 GM/DL — LOW (ref 32–36)
MCV RBC AUTO: 85.4 FL — SIGNIFICANT CHANGE UP (ref 80–100)
MONOCYTES # BLD AUTO: 0.54 K/UL — SIGNIFICANT CHANGE UP (ref 0–0.9)
MONOCYTES NFR BLD AUTO: 6.5 % — SIGNIFICANT CHANGE UP (ref 2–14)
NEUTROPHILS # BLD AUTO: 4.14 K/UL — SIGNIFICANT CHANGE UP (ref 1.8–7.4)
NEUTROPHILS NFR BLD AUTO: 49.6 % — SIGNIFICANT CHANGE UP (ref 43–77)
NITRITE UR-MCNC: NEGATIVE — SIGNIFICANT CHANGE UP
NRBC # BLD: 0 /100 WBCS — SIGNIFICANT CHANGE UP (ref 0–0)
PH UR: 7 — SIGNIFICANT CHANGE UP (ref 5–8)
PLATELET # BLD AUTO: 251 K/UL — SIGNIFICANT CHANGE UP (ref 150–400)
POTASSIUM SERPL-MCNC: 3.9 MMOL/L — SIGNIFICANT CHANGE UP (ref 3.5–5.3)
POTASSIUM SERPL-SCNC: 3.9 MMOL/L — SIGNIFICANT CHANGE UP (ref 3.5–5.3)
PROT SERPL-MCNC: 8.2 G/DL — SIGNIFICANT CHANGE UP (ref 6.4–8.2)
PROT UR-MCNC: NEGATIVE MG/DL — SIGNIFICANT CHANGE UP
RBC # BLD: 5.41 M/UL — SIGNIFICANT CHANGE UP (ref 4.2–5.8)
RBC # FLD: 14.3 % — SIGNIFICANT CHANGE UP (ref 10.3–14.5)
SODIUM SERPL-SCNC: 144 MMOL/L — SIGNIFICANT CHANGE UP (ref 132–145)
SP GR SPEC: 1.01 — SIGNIFICANT CHANGE UP (ref 1–1.03)
UROBILINOGEN FLD QL: 0.2 E.U./DL — SIGNIFICANT CHANGE UP
WBC # BLD: 8.33 K/UL — SIGNIFICANT CHANGE UP (ref 3.8–10.5)
WBC # FLD AUTO: 8.33 K/UL — SIGNIFICANT CHANGE UP (ref 3.8–10.5)

## 2020-09-18 PROCEDURE — 99285 EMERGENCY DEPT VISIT HI MDM: CPT

## 2020-09-18 PROCEDURE — 74177 CT ABD & PELVIS W/CONTRAST: CPT | Mod: 26

## 2020-09-18 RX ORDER — IOHEXOL 300 MG/ML
30 INJECTION, SOLUTION INTRAVENOUS ONCE
Refills: 0 | Status: COMPLETED | OUTPATIENT
Start: 2020-09-18 | End: 2020-09-18

## 2020-09-18 RX ADMIN — IOHEXOL 30 MILLILITER(S): 300 INJECTION, SOLUTION INTRAVENOUS at 14:57

## 2020-09-18 NOTE — ED ADULT NURSE NOTE - CHPI ED NUR SYMPTOMS NEG
no diarrhea/no blood in stool/no burning urination/no chills/no fever/no hematuria/no dysuria/no abdominal distension/no vomiting/no nausea

## 2020-09-18 NOTE — ED PROVIDER NOTE - PHYSICAL EXAMINATION
Physical Exam  GEN: Awake, alert, non-toxic appearing, NCAT  EYES: full EOMI,  ENT: External inspection normal, normal voice,   HEAD: atraumatic  NECK: FROM neck, supple, no meningismus, trachea midline, no JVD  CV: rrr,  RESP: cta bl, no tachypnea, no hypoxia, no resp distress,  GI: +BS, Soft, nontender, no guarding/rebound, rectal exam w/ normal tone, normal palpable hernia   : no scrotal swelling/tenderness/erythema, no penile swelling/tenderness,   MSK: FROM all 4 extremities,   SKIN: Color normal for race, warm and dry, no rash  LYMPH: no obvious inguinal lymphadenopathy  NEURO: Oriented x3, CN 2-12 grossly intact, normal motor, strength 4+/5 in L hip (per pt, baseline weakness since accident/surg), 5/5 and equal in other extremities, [place] x 4, steady gait, clear speech,

## 2020-09-18 NOTE — ED PROVIDER NOTE - PATIENT PORTAL LINK FT
You can access the FollowMyHealth Patient Portal offered by [facility] by registering at the following website: [email or web address]. By joining [employer]’s FollowMyHealth portal, you will also be able to view your health information using other applications (apps) compatible with our system.

## 2020-09-18 NOTE — ED PROVIDER NOTE - OBJECTIVE STATEMENT
65 yom pw lower back pain bl, L>R.  per pt, his doctor changed his HTN 1 wk ago, and since then, had felt gas in abd/bloating but abdominal pain, diarrhea x 1 day.  pt states "some days the pain is there, some days not", nonradiating, sometimes triggered by lifting heavy objects, worse w/ certain position.  no difficulty urinating.  no paresthesia to groin but reports "humid" (describes as more sweaty) in his groin w/ smell x 1wk. 65 yom pw lower back pain bl, L>R.  per pt, his doctor changed his HTN 1 wk ago, and since then, had felt gas in abd/bloating but abdominal pain, diarrhea x 1 day.  pt states "some days the pain is there, some days not", nonradiating, sometimes triggered by lifting heavy objects, worse w/ certain position.  no difficulty urinating.  no paresthesia to groin but reports "humid" (describes as more sweaty) in his groin w/ smell x 1wk.  pt did endorse lifting heavy objects 1 wk ago.  denies prior spinal instrumentation.  denies any IVDU. 65 yom pw lower back pain bl, L>R.  per pt, his doctor changed his HTN 1 wk ago, and since then, had felt gas in abd/bloating but abdominal pain, diarrhea x 1 day.  pt states "some days the pain is there, some days not", nonradiating, sometimes triggered by lifting heavy objects, worse w/ certain position.  no difficulty urinating.  no paresthesia to groin but reports "humid" (describes as more sweaty) in his groin w/ smell x 1wk.  pt did endorse lifting heavy objects 1 wk ago.  denies prior spinal instrumentation.  denies any IVDU.    offered translation service, pt prefers daughter to translate

## 2020-09-18 NOTE — ED PROVIDER NOTE - NSFOLLOWUPINSTRUCTIONS_ED_ALL_ED_FT
Follow up with your primary care doctor  You have a cyst on your liver  Please discuss with your primary care doctor for further evaluation (a sonogram)  Return immediately for any new or worsening symptoms or any new concerns

## 2020-09-18 NOTE — ED PROVIDER NOTE - CLINICAL SUMMARY MEDICAL DECISION MAKING FREE TEXT BOX
lower back pain L>R, no urinary sx, no incontinence, no paresthesia, normal rectal tone and normal baseline strength, less likely to be cauda equina/compression/hematoma/abscess, will check UA/urine culture, will check CT to r/o acute intraabd surg pathology for abd distension,

## 2020-09-19 LAB
CULTURE RESULTS: SIGNIFICANT CHANGE UP
SPECIMEN SOURCE: SIGNIFICANT CHANGE UP

## 2020-09-30 ENCOUNTER — APPOINTMENT (OUTPATIENT)
Dept: HEART AND VASCULAR | Facility: CLINIC | Age: 65
End: 2020-09-30
Payer: MEDICARE

## 2020-09-30 VITALS — SYSTOLIC BLOOD PRESSURE: 150 MMHG | DIASTOLIC BLOOD PRESSURE: 90 MMHG | HEART RATE: 55 BPM

## 2020-09-30 PROCEDURE — 99214 OFFICE O/P EST MOD 30 MIN: CPT

## 2020-09-30 RX ORDER — LISINOPRIL 10 MG/1
10 TABLET ORAL DAILY
Qty: 1 | Refills: 3 | Status: DISCONTINUED | COMMUNITY
Start: 2020-09-02 | End: 2020-09-30

## 2020-09-30 RX ORDER — AMLODIPINE BESYLATE 10 MG/1
10 TABLET ORAL DAILY
Qty: 30 | Refills: 3 | Status: ACTIVE | COMMUNITY
Start: 2020-09-30 | End: 1900-01-01

## 2020-09-30 RX ORDER — HYDROCHLOROTHIAZIDE 12.5 MG/1
12.5 TABLET ORAL DAILY
Qty: 30 | Refills: 3 | Status: ACTIVE | COMMUNITY
Start: 2020-09-30 | End: 1900-01-01

## 2020-09-30 NOTE — PHYSICAL EXAM
[General Appearance - Well Developed] : well developed [Well Groomed] : well groomed [Normal Appearance] : normal appearance [No Deformities] : no deformities [General Appearance - Well Nourished] : well nourished [General Appearance - In No Acute Distress] : no acute distress [Normal Oral Mucosa] : normal oral mucosa [No Oral Pallor] : no oral pallor [No Oral Cyanosis] : no oral cyanosis [Normal Jugular Venous A Waves Present] : normal jugular venous A waves present [No Jugular Venous Leblanc A Waves] : no jugular venous leblanc A waves [Normal Jugular Venous V Waves Present] : normal jugular venous V waves present [Heart Rate And Rhythm] : heart rate and rhythm were normal [Murmurs] : no murmurs present [Heart Sounds] : normal S1 and S2 [Respiration, Rhythm And Depth] : normal respiratory rhythm and effort [Exaggerated Use Of Accessory Muscles For Inspiration] : no accessory muscle use [Auscultation Breath Sounds / Voice Sounds] : lungs were clear to auscultation bilaterally [Abdomen Soft] : soft [] : no hepato-splenomegaly [Abdomen Tenderness] : non-tender [Abdomen Mass (___ Cm)] : no abdominal mass palpated [FreeTextEntry1] : no edema b/l

## 2020-09-30 NOTE — HISTORY OF PRESENT ILLNESS
[FreeTextEntry1] : 65-year-old man with past medical history of hypertension here for follow up\par The patient reports that he stopped taking lisinopril as prescribed previously in the setting of GI disturbances. Reports that the symptoms as stopped after discontinuing the lisinopril. He is currently only taking amlodipine 10 mg daily\par Reports improvement of his lower extremity edema despite that taking amlodipine\par Patient has excellent exercise tolerance,reports walking for half an hour without any symptoms\par Denies chest pain, shortness of breath, palpitations, syncope, presyncope, LE edema, orthopnea. \par \par \par PMH \par HTN\par \par ALL\par NKDA\par \par MEDS\par amlodipine 10 mg daily \par HCTZ 12.5 mg daily \par \par FH\par family h/o HTN\par \par SH \par no smoke, no etoh, no drugs\par \par Labs 8/5/020\par Creatinine 1.07\par AST ALT within normal limits\par Hemoglobin A1c 5.8\par Cholesterol 178\par HDL 45\par Triglycerides 107\par \par \par EKG 9/2/2020\par Sinus bradycardia heart rate 52\par \par Echocardiogram 9/23/2020\par LV size wall thickness and systolic function are normal (EF 55%)\par The diastolic filling pattern indicates impaired relaxation\par  mild to moderate MR\par Mild TR\par \par \par Echocardiogram 9/23/2020\par LV size wall thickness and systolic function are normal (EF 55%)\par The diastolic filling pattern indicates impaired relaxation\par  mild to moderate MR\par Mild TR\par \par Labs 8/5/2020\par Creatinine 1.0\par AST/ALT within normal limits\par Cholesterol 178\par HDL 45\par \par Triglycerides 107

## 2020-09-30 NOTE — ASSESSMENT
[FreeTextEntry1] : 65-year-old man with past medical history of hypertension here for follow up evaluation\par \par LE edema\par -resolved\par -echo 9/2020 wnl\par -Patient reports intolerance to lisinopril, continued taking amlodipine without lower extremity edema\par -will start HCTZ 12.5 mg daily and cont to monitor\par -Renal and hepatic panel wnl\par \par ESTRADA\par -resolved with excellent exercise tolerance\par -echo wnl\par -consider holter in setting of bradycardia on EKG if recurrence of symptoms \par -labs wnl\par -BP control as below\par \par HTN\par -Uncontrolled\par -Echocardiogram wnl\par -continue with amlodipine 10 mg\par -Start hydrochlorothiazide 12.5 mg (recc ompliance with meds)\par -Recommend blood pressure check at home and keep blood pressure log and call the clinic if BP>140/90\par \par HLD\par -Lipid panel reviewed\par -Recommend healthy diet and exercise\par \par BRADYCARDIA\par -sinus bradycardia on EKG, patient denies dizziness, syncope or presycnope\par -echo as above\par \par Followup in 3 months or sooner if any symptoms

## 2020-12-16 ENCOUNTER — APPOINTMENT (OUTPATIENT)
Dept: HEART AND VASCULAR | Facility: CLINIC | Age: 65
End: 2020-12-16

## 2020-12-23 ENCOUNTER — APPOINTMENT (OUTPATIENT)
Dept: HEART AND VASCULAR | Facility: CLINIC | Age: 65
End: 2020-12-23
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
## 2021-12-10 NOTE — ED PROVIDER NOTE - CONSTITUTIONAL, MLM
Vanesa Melendez(NP) normal... Well appearing, well nourished, awake, alert, oriented to person, place, time/situation and in no apparent distress.

## 2022-01-19 ENCOUNTER — EMERGENCY (EMERGENCY)
Facility: HOSPITAL | Age: 67
LOS: 1 days | Discharge: HOME | End: 2022-01-19
Admitting: EMERGENCY MEDICINE
Payer: MEDICARE

## 2022-01-19 VITALS
HEART RATE: 65 BPM | DIASTOLIC BLOOD PRESSURE: 83 MMHG | RESPIRATION RATE: 16 BRPM | OXYGEN SATURATION: 96 % | HEIGHT: 66 IN | SYSTOLIC BLOOD PRESSURE: 160 MMHG | TEMPERATURE: 98 F | WEIGHT: 195.11 LBS

## 2022-01-19 VITALS
DIASTOLIC BLOOD PRESSURE: 74 MMHG | HEART RATE: 50 BPM | RESPIRATION RATE: 16 BRPM | SYSTOLIC BLOOD PRESSURE: 134 MMHG | TEMPERATURE: 98 F | OXYGEN SATURATION: 99 %

## 2022-01-19 DIAGNOSIS — R42 DIZZINESS AND GIDDINESS: ICD-10-CM

## 2022-01-19 DIAGNOSIS — Z20.822 CONTACT WITH AND (SUSPECTED) EXPOSURE TO COVID-19: ICD-10-CM

## 2022-01-19 DIAGNOSIS — I10 ESSENTIAL (PRIMARY) HYPERTENSION: ICD-10-CM

## 2022-01-19 LAB
ALBUMIN SERPL ELPH-MCNC: 3.6 G/DL — SIGNIFICANT CHANGE UP (ref 3.4–5)
ALP SERPL-CCNC: 106 U/L — SIGNIFICANT CHANGE UP (ref 40–120)
ALT FLD-CCNC: 18 U/L — SIGNIFICANT CHANGE UP (ref 12–42)
ANION GAP SERPL CALC-SCNC: 8 MMOL/L — LOW (ref 9–16)
AST SERPL-CCNC: 26 U/L — SIGNIFICANT CHANGE UP (ref 15–37)
BASOPHILS # BLD AUTO: 0.03 K/UL — SIGNIFICANT CHANGE UP (ref 0–0.2)
BASOPHILS NFR BLD AUTO: 0.4 % — SIGNIFICANT CHANGE UP (ref 0–2)
BILIRUB SERPL-MCNC: 0.3 MG/DL — SIGNIFICANT CHANGE UP (ref 0.2–1.2)
BUN SERPL-MCNC: 12 MG/DL — SIGNIFICANT CHANGE UP (ref 7–23)
CALCIUM SERPL-MCNC: 8.8 MG/DL — SIGNIFICANT CHANGE UP (ref 8.5–10.5)
CHLORIDE SERPL-SCNC: 102 MMOL/L — SIGNIFICANT CHANGE UP (ref 96–108)
CO2 SERPL-SCNC: 28 MMOL/L — SIGNIFICANT CHANGE UP (ref 22–31)
CREAT SERPL-MCNC: 1.13 MG/DL — SIGNIFICANT CHANGE UP (ref 0.5–1.3)
EOSINOPHIL # BLD AUTO: 0.14 K/UL — SIGNIFICANT CHANGE UP (ref 0–0.5)
EOSINOPHIL NFR BLD AUTO: 1.9 % — SIGNIFICANT CHANGE UP (ref 0–6)
GLUCOSE SERPL-MCNC: 114 MG/DL — HIGH (ref 70–99)
HCT VFR BLD CALC: 46.7 % — SIGNIFICANT CHANGE UP (ref 39–50)
HGB BLD-MCNC: 14.8 G/DL — SIGNIFICANT CHANGE UP (ref 13–17)
IMM GRANULOCYTES NFR BLD AUTO: 0.4 % — SIGNIFICANT CHANGE UP (ref 0–1.5)
LYMPHOCYTES # BLD AUTO: 2.61 K/UL — SIGNIFICANT CHANGE UP (ref 1–3.3)
LYMPHOCYTES # BLD AUTO: 35.3 % — SIGNIFICANT CHANGE UP (ref 13–44)
MCHC RBC-ENTMCNC: 26.6 PG — LOW (ref 27–34)
MCHC RBC-ENTMCNC: 31.7 GM/DL — LOW (ref 32–36)
MCV RBC AUTO: 83.8 FL — SIGNIFICANT CHANGE UP (ref 80–100)
MONOCYTES # BLD AUTO: 0.59 K/UL — SIGNIFICANT CHANGE UP (ref 0–0.9)
MONOCYTES NFR BLD AUTO: 8 % — SIGNIFICANT CHANGE UP (ref 2–14)
NEUTROPHILS # BLD AUTO: 3.99 K/UL — SIGNIFICANT CHANGE UP (ref 1.8–7.4)
NEUTROPHILS NFR BLD AUTO: 54 % — SIGNIFICANT CHANGE UP (ref 43–77)
NRBC # BLD: 0 /100 WBCS — SIGNIFICANT CHANGE UP (ref 0–0)
PLATELET # BLD AUTO: 266 K/UL — SIGNIFICANT CHANGE UP (ref 150–400)
POTASSIUM SERPL-MCNC: 3.3 MMOL/L — LOW (ref 3.5–5.3)
POTASSIUM SERPL-SCNC: 3.3 MMOL/L — LOW (ref 3.5–5.3)
PROT SERPL-MCNC: 8.2 G/DL — SIGNIFICANT CHANGE UP (ref 6.4–8.2)
RBC # BLD: 5.57 M/UL — SIGNIFICANT CHANGE UP (ref 4.2–5.8)
RBC # FLD: 14.2 % — SIGNIFICANT CHANGE UP (ref 10.3–14.5)
SARS-COV-2 RNA SPEC QL NAA+PROBE: SIGNIFICANT CHANGE UP
SODIUM SERPL-SCNC: 138 MMOL/L — SIGNIFICANT CHANGE UP (ref 132–145)
TROPONIN I, HIGH SENSITIVITY RESULT: 10 NG/L — SIGNIFICANT CHANGE UP
TROPONIN I, HIGH SENSITIVITY RESULT: 9 NG/L — SIGNIFICANT CHANGE UP
WBC # BLD: 7.39 K/UL — SIGNIFICANT CHANGE UP (ref 3.8–10.5)
WBC # FLD AUTO: 7.39 K/UL — SIGNIFICANT CHANGE UP (ref 3.8–10.5)

## 2022-01-19 PROCEDURE — 71045 X-RAY EXAM CHEST 1 VIEW: CPT | Mod: 26

## 2022-01-19 PROCEDURE — 99285 EMERGENCY DEPT VISIT HI MDM: CPT | Mod: 25

## 2022-01-19 PROCEDURE — 93010 ELECTROCARDIOGRAM REPORT: CPT

## 2022-01-19 RX ORDER — POTASSIUM CHLORIDE 20 MEQ
40 PACKET (EA) ORAL ONCE
Refills: 0 | Status: COMPLETED | OUTPATIENT
Start: 2022-01-19 | End: 2022-01-19

## 2022-01-19 RX ORDER — SODIUM CHLORIDE 9 MG/ML
1000 INJECTION INTRAMUSCULAR; INTRAVENOUS; SUBCUTANEOUS ONCE
Refills: 0 | Status: COMPLETED | OUTPATIENT
Start: 2022-01-19 | End: 2022-01-19

## 2022-01-19 RX ADMIN — Medication 40 MILLIEQUIVALENT(S): at 10:26

## 2022-01-19 RX ADMIN — SODIUM CHLORIDE 1000 MILLILITER(S): 9 INJECTION INTRAMUSCULAR; INTRAVENOUS; SUBCUTANEOUS at 13:14

## 2022-01-19 NOTE — ED ADULT NURSE REASSESSMENT NOTE - NS ED NURSE REASSESS COMMENT FT1
Pt received from EFFIE Su. Pending 1 liter NS. Patient provided for rounding. Patient in no apparent distress. Resting comfortably. Patient provided for emotional support, comfort, safety, and review of plan of care. Will continue to monitor.

## 2022-01-19 NOTE — ED PROVIDER NOTE - PATIENT PORTAL LINK FT
You can access the FollowMyHealth Patient Portal offered by [facility] by registering at the following website: [email or web address]. By joining [employer]’s FollowMyHealth portal, you will also be able to view your health information using other applications (apps) compatible with our system.

## 2022-01-19 NOTE — ED PROVIDER NOTE - CHPI ED SYMPTOMS NEG
no chills, no headache, no chest pain, no SOB, no abdominal pain, no nausea, no vomiting, no diarrhea, no numbness/no fever/no weakness

## 2022-01-19 NOTE — ED ADULT TRIAGE NOTE - TEMPERATURE IN FAHRENHEIT (DEGREES F)
Social Work Telephone Message Note  [facility]    Patient Name:  Janet Regan  /Age:  1954 (66 year old)    Referral Source: [facility]  Madie Owens [facility]  Outpatient Specialty Clinics  Direct Phone: [phone]  Pager:  [phone]      Patient returned 's phone call on 3/12/2020. Patient Madie Owens [facility]  Outpatient Specialty Clinics  Direct Phone: [phone]  Pager:  [phone]    
98.1

## 2022-01-19 NOTE — ED PROVIDER NOTE - OBJECTIVE STATEMENT
65 y/o M with Hx of HTN coming in stating he had a little bit of lightheadedness this morning. Pt denies fevers/chills, headache, chest pain, SOB, abdominal pain, N/V/D, weakness, and numbness.

## 2022-01-19 NOTE — ED PROVIDER NOTE - CLINICAL SUMMARY MEDICAL DECISION MAKING FREE TEXT BOX
67 y/o M with Hx of HTN presents with lightheadedness since this morning. Ordered labs, EKG, CXR, and medications. Will get 2 sets of cardiac enzymes and reevaluate.

## 2022-01-19 NOTE — ED ADULT NURSE REASSESSMENT NOTE - NS ED NURSE REASSESS COMMENT FT1
Pt care handed over to myself from gigi laurent, checked in on patient, in NSR on cardiac monitor nil c/o pain gcs 15 , meds given

## 2022-02-24 NOTE — ED ADULT TRIAGE NOTE - HEART RATE (BEATS/MIN)
56 Isotretinoin Counseling: Patient should get monthly blood tests, not donate blood, not drive at night if vision affected, not share medication, and not undergo elective surgery for 6 months after tx completed. Side effects reviewed, pt to contact office should one occur.

## 2022-09-19 ENCOUNTER — EMERGENCY (EMERGENCY)
Facility: HOSPITAL | Age: 67
LOS: 1 days | Discharge: HOME | End: 2022-09-19
Attending: EMERGENCY MEDICINE | Admitting: EMERGENCY MEDICINE

## 2022-09-19 VITALS
WEIGHT: 197.98 LBS | HEIGHT: 67 IN | DIASTOLIC BLOOD PRESSURE: 68 MMHG | HEART RATE: 49 BPM | SYSTOLIC BLOOD PRESSURE: 146 MMHG | RESPIRATION RATE: 18 BRPM | OXYGEN SATURATION: 99 % | TEMPERATURE: 98 F

## 2022-09-19 LAB — GLUCOSE BLDC GLUCOMTR-MCNC: 95 MG/DL — SIGNIFICANT CHANGE UP (ref 70–99)

## 2022-09-19 PROCEDURE — 99283 EMERGENCY DEPT VISIT LOW MDM: CPT

## 2022-09-19 RX ORDER — NYSTATIN CREAM 100000 [USP'U]/G
1 CREAM TOPICAL ONCE
Refills: 0 | Status: COMPLETED | OUTPATIENT
Start: 2022-09-19 | End: 2022-09-19

## 2022-09-19 RX ORDER — FLUCONAZOLE 150 MG/1
150 TABLET ORAL ONCE
Refills: 0 | Status: COMPLETED | OUTPATIENT
Start: 2022-09-19 | End: 2022-09-19

## 2022-09-19 RX ORDER — NYSTATIN/TRIAMCINOLONE ACET
1 OINTMENT (GRAM) TOPICAL ONCE
Refills: 0 | Status: DISCONTINUED | OUTPATIENT
Start: 2022-09-19 | End: 2022-09-19

## 2022-09-19 RX ORDER — FLUCONAZOLE 150 MG/1
1 TABLET ORAL
Qty: 3 | Refills: 0
Start: 2022-09-19 | End: 2022-10-09

## 2022-09-19 RX ORDER — NYSTATIN/TRIAMCINOLONE ACET
1 OINTMENT (GRAM) TOPICAL
Qty: 60 | Refills: 0
Start: 2022-09-19 | End: 2022-10-02

## 2022-09-19 RX ORDER — NYSTATIN CREAM 100000 [USP'U]/G
1 CREAM TOPICAL
Qty: 1 | Refills: 0
Start: 2022-09-19 | End: 2022-10-02

## 2022-09-19 RX ADMIN — FLUCONAZOLE 150 MILLIGRAM(S): 150 TABLET ORAL at 13:54

## 2022-09-19 RX ADMIN — NYSTATIN CREAM 1 APPLICATION(S): 100000 CREAM TOPICAL at 13:55

## 2022-09-19 NOTE — ED PROVIDER NOTE - CONDITION AT DISCHARGE:
TRANSFER - OUT REPORT:    Verbal report given to CarleyRN(name) on Pablo Whitlock  being transferred to 54(unit) for routine post - op       Report consisted of patients Situation, Background, Assessment and   Recommendations(SBAR). Time Pre op antibiotic given:14:35 Ancef  Anesthesia Stop time: 17:19  Garcia Present on Transfer to floor:no  Order for Garcia on Chart:no  Discharge Prescriptions with Chart:no    Information from the following report(s) SBAR, Kardex, OR Summary, Procedure Summary, Intake/Output and MAR was reviewed with the receiving nurse. Opportunity for questions and clarification was provided. Is the patient on 02? YES       L/Min 4       Other     Is the patient on a monitor? NO    Is the nurse transporting with the patient? NO    Surgical Waiting Area notified of patient's transfer from PACU?  YES      The following personal items collected during your admission accompanied patient upon transfer:   Dental Appliance: Dental Appliances: None  Vision: Visual Aid: None  Hearing Aid:    Jewelry:    Clothing: Clothing: Other (comment) (bag of clothes returned to patient)  Other Valuables:    Valuables sent to safe: Improved

## 2022-09-19 NOTE — ED PROVIDER NOTE - PATIENT PORTAL LINK FT
You can access the FollowMyHealth Patient Portal offered by [facility] by registering at the following website: [email or web address]. By joining [employer]’s FollowMyHealth portal, you will also be able to view your health information using other applications (apps) compatible with our system.

## 2022-09-19 NOTE — ED PROVIDER NOTE - OBJECTIVE STATEMENT
67 M here with itchy groin rash x ~ 8d. Also a bit foul smelling. No f/c. No hx of same. Is being followed by his PMD for pre-DM. The rash is between his thighs and under his testes. No testicle pain.

## 2022-09-19 NOTE — ED PROVIDER NOTE - NSFOLLOWUPINSTRUCTIONS_ED_ALL_ED_FT
Jock Itch    WHAT YOU NEED TO KNOW:    Jock itch is a rash on your groin. Jock itch is usually easy to treat and prevent. It is caused by a fungus. The fungus also causes athlete's foot.    DISCHARGE INSTRUCTIONS:    Medicines:   •Fungus medicine: Jock itch is usually treated with a cream that kills the fungus. Apply the cream to the rash and the skin around it as directed. You may need to apply the cream 1 to 2 times each day for 2 weeks. You may be given this medicine as a pill if the cream does not help.       •Take your medicine as directed. Contact your healthcare provider if you think your medicine is not helping or if you have side effects. Tell your provider if you are allergic to any medicine. Keep a list of the medicines, vitamins, and herbs you take. Include the amounts, and when and why you take them. Bring the list or the pill bottles to follow-up visits. Carry your medicine list with you in case of an emergency.      Manage and prevent jock itch:   •Keep the area dry.      •Wear light, loose clothes. Do not share clothes.      •Do not wear wet clothes for long periods. Wash athletic gear after you play sports.      •Bathe daily. Dry your skin completely after you bathe. Apply cream or powder after you bathe as directed if you get jock itch often. Wash your hands often to prevent the spread of the fungus. You may want to wear disposable gloves when you clean your feet. The gloves will keep the fungus from moving from your feet to your hands.      •Use separate towels to dry each part of your body. Put your socks on before you put on your underwear so you do not spread the fungus from your feet to your groin.      •Lose weight if you weigh more than your healthcare provider suggests.      Follow up with your healthcare provider or skin specialist as directed: Your provider will examine your rash to see how well it is healing. If you take medicine as a pill, you may need blood tests to check how your liver and kidneys are working. Write down your questions so you remember to ask them during your visits.    Contact your healthcare provider or skin specialist if:   •Your signs and symptoms do not get better within 2 weeks of treatment.      •Your signs and symptoms get worse or come back after treatment.      •You get a rash on a part of your body other than your groin.      •You have a fever.      •You have questions or concerns about your condition or care.

## 2022-09-19 NOTE — ED PROVIDER NOTE - DATE/TIME OF ACCEPTANCE
Discharge Planner   Discharge Plans in progress: Yes  Barriers to discharge plan: IV Lasix  Follow up plan: F/U appt with Venu Maria PA-C on Monday, 7/30/18 at 1:40pm.        Entered by: Aneta Malone 07/22/2018 10:16 AM          19-Sep-2022 12:45

## 2022-09-19 NOTE — ED PROVIDER NOTE - SKIN, MLM
+ groin rash under testes and to inner thighs consistent with tinea cruris, no discharge or skin breakdown.

## 2022-09-21 DIAGNOSIS — R73.03 PREDIABETES: ICD-10-CM

## 2022-09-21 DIAGNOSIS — B35.6 TINEA CRURIS: ICD-10-CM

## 2022-09-21 DIAGNOSIS — R21 RASH AND OTHER NONSPECIFIC SKIN ERUPTION: ICD-10-CM

## 2022-10-04 NOTE — ED ADULT NURSE NOTE - NS_SISCREENINGSR_GEN_ALL_ED
Called patient - name and  verified. She states she sees Dr. 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 She started Kwaxuma about four moths ago and she did have skin bleeding issue prior to that, but it has worsened for last 2 weeks. The oncologist think it is from Kwaxuma. Patient states Kwaxuma is helping her. So please advise what should she do? She was informed, more likely will have to stop Savella once provider reviews information. Appointment scheduled with Dr. Rebeca Ram on Friday.      Future Appointments   Date Time Provider Kelly Saldaña   10/7/2022 11:00 AM Daniella Augustine MD  [facility] OF THE [facility]   2022  3:30 PM Bari Lassiter MD [facility] OF THE [facility]   2022 10:40 AM Daniella Augustine MD  [facility] Negative

## 2022-10-15 ENCOUNTER — INPATIENT (INPATIENT)
Facility: HOSPITAL | Age: 67
LOS: 0 days | Discharge: HOME | DRG: 312 | End: 2022-10-16
Attending: INTERNAL MEDICINE | Admitting: INTERNAL MEDICINE
Payer: MEDICARE

## 2022-10-15 VITALS
RESPIRATION RATE: 16 BRPM | WEIGHT: 199.96 LBS | HEIGHT: 67 IN | DIASTOLIC BLOOD PRESSURE: 94 MMHG | OXYGEN SATURATION: 97 % | TEMPERATURE: 98 F | SYSTOLIC BLOOD PRESSURE: 187 MMHG | HEART RATE: 74 BPM

## 2022-10-15 DIAGNOSIS — R55 SYNCOPE AND COLLAPSE: ICD-10-CM

## 2022-10-15 DIAGNOSIS — R00.1 BRADYCARDIA, UNSPECIFIED: ICD-10-CM

## 2022-10-15 DIAGNOSIS — I16.0 HYPERTENSIVE URGENCY: ICD-10-CM

## 2022-10-15 DIAGNOSIS — Z29.9 ENCOUNTER FOR PROPHYLACTIC MEASURES, UNSPECIFIED: ICD-10-CM

## 2022-10-15 DIAGNOSIS — R07.9 CHEST PAIN, UNSPECIFIED: ICD-10-CM

## 2022-10-15 DIAGNOSIS — I10 ESSENTIAL (PRIMARY) HYPERTENSION: ICD-10-CM

## 2022-10-15 PROBLEM — R73.03 PREDIABETES: Chronic | Status: ACTIVE | Noted: 2022-09-19

## 2022-10-15 LAB
ALBUMIN SERPL ELPH-MCNC: 3.3 G/DL — LOW (ref 3.4–5)
ALBUMIN SERPL ELPH-MCNC: 3.9 G/DL — SIGNIFICANT CHANGE UP (ref 3.3–5)
ALP SERPL-CCNC: 94 U/L — SIGNIFICANT CHANGE UP (ref 40–120)
ALP SERPL-CCNC: 95 U/L — SIGNIFICANT CHANGE UP (ref 40–120)
ALT FLD-CCNC: 14 U/L — SIGNIFICANT CHANGE UP (ref 10–45)
ALT FLD-CCNC: 22 U/L — SIGNIFICANT CHANGE UP (ref 12–42)
ANION GAP SERPL CALC-SCNC: -1 MMOL/L — LOW (ref 9–16)
ANION GAP SERPL CALC-SCNC: 8 MMOL/L — SIGNIFICANT CHANGE UP (ref 5–17)
APTT BLD: 32.6 SEC — SIGNIFICANT CHANGE UP (ref 27.5–35.5)
AST SERPL-CCNC: 20 U/L — SIGNIFICANT CHANGE UP (ref 15–37)
AST SERPL-CCNC: 21 U/L — SIGNIFICANT CHANGE UP (ref 10–40)
BASOPHILS # BLD AUTO: 0.02 K/UL — SIGNIFICANT CHANGE UP (ref 0–0.2)
BASOPHILS NFR BLD AUTO: 0.2 % — SIGNIFICANT CHANGE UP (ref 0–2)
BILIRUB SERPL-MCNC: 0.2 MG/DL — SIGNIFICANT CHANGE UP (ref 0.2–1.2)
BILIRUB SERPL-MCNC: 0.3 MG/DL — SIGNIFICANT CHANGE UP (ref 0.2–1.2)
BUN SERPL-MCNC: 13 MG/DL — SIGNIFICANT CHANGE UP (ref 7–23)
BUN SERPL-MCNC: 18 MG/DL — SIGNIFICANT CHANGE UP (ref 7–23)
CALCIUM SERPL-MCNC: 8.6 MG/DL — SIGNIFICANT CHANGE UP (ref 8.5–10.5)
CALCIUM SERPL-MCNC: 9 MG/DL — SIGNIFICANT CHANGE UP (ref 8.4–10.5)
CHLORIDE SERPL-SCNC: 103 MMOL/L — SIGNIFICANT CHANGE UP (ref 96–108)
CHLORIDE SERPL-SCNC: 106 MMOL/L — SIGNIFICANT CHANGE UP (ref 96–108)
CK SERPL-CCNC: 195 U/L — SIGNIFICANT CHANGE UP (ref 39–308)
CO2 SERPL-SCNC: 26 MMOL/L — SIGNIFICANT CHANGE UP (ref 22–31)
CO2 SERPL-SCNC: 31 MMOL/L — SIGNIFICANT CHANGE UP (ref 22–31)
CREAT SERPL-MCNC: 1.04 MG/DL — SIGNIFICANT CHANGE UP (ref 0.5–1.3)
CREAT SERPL-MCNC: 1.24 MG/DL — SIGNIFICANT CHANGE UP (ref 0.5–1.3)
EGFR: 64 ML/MIN/1.73M2 — SIGNIFICANT CHANGE UP
EGFR: 79 ML/MIN/1.73M2 — SIGNIFICANT CHANGE UP
EOSINOPHIL # BLD AUTO: 0.11 K/UL — SIGNIFICANT CHANGE UP (ref 0–0.5)
EOSINOPHIL NFR BLD AUTO: 1.3 % — SIGNIFICANT CHANGE UP (ref 0–6)
GLUCOSE SERPL-MCNC: 113 MG/DL — HIGH (ref 70–99)
GLUCOSE SERPL-MCNC: 140 MG/DL — HIGH (ref 70–99)
HCT VFR BLD CALC: 42.9 % — SIGNIFICANT CHANGE UP (ref 39–50)
HCT VFR BLD CALC: 43.4 % — SIGNIFICANT CHANGE UP (ref 39–50)
HGB BLD-MCNC: 13.6 G/DL — SIGNIFICANT CHANGE UP (ref 13–17)
HGB BLD-MCNC: 14.1 G/DL — SIGNIFICANT CHANGE UP (ref 13–17)
IMM GRANULOCYTES NFR BLD AUTO: 0.2 % — SIGNIFICANT CHANGE UP (ref 0–0.9)
INR BLD: 1.13 — SIGNIFICANT CHANGE UP (ref 0.88–1.16)
LYMPHOCYTES # BLD AUTO: 3.11 K/UL — SIGNIFICANT CHANGE UP (ref 1–3.3)
LYMPHOCYTES # BLD AUTO: 38 % — SIGNIFICANT CHANGE UP (ref 13–44)
MAGNESIUM SERPL-MCNC: 2 MG/DL — SIGNIFICANT CHANGE UP (ref 1.6–2.6)
MAGNESIUM SERPL-MCNC: 2.2 MG/DL — SIGNIFICANT CHANGE UP (ref 1.6–2.6)
MCHC RBC-ENTMCNC: 27.1 PG — SIGNIFICANT CHANGE UP (ref 27–34)
MCHC RBC-ENTMCNC: 27.3 PG — SIGNIFICANT CHANGE UP (ref 27–34)
MCHC RBC-ENTMCNC: 31.7 GM/DL — LOW (ref 32–36)
MCHC RBC-ENTMCNC: 32.5 GM/DL — SIGNIFICANT CHANGE UP (ref 32–36)
MCV RBC AUTO: 83.3 FL — SIGNIFICANT CHANGE UP (ref 80–100)
MCV RBC AUTO: 86.1 FL — SIGNIFICANT CHANGE UP (ref 80–100)
MONOCYTES # BLD AUTO: 0.52 K/UL — SIGNIFICANT CHANGE UP (ref 0–0.9)
MONOCYTES NFR BLD AUTO: 6.4 % — SIGNIFICANT CHANGE UP (ref 2–14)
NEUTROPHILS # BLD AUTO: 4.4 K/UL — SIGNIFICANT CHANGE UP (ref 1.8–7.4)
NEUTROPHILS NFR BLD AUTO: 53.9 % — SIGNIFICANT CHANGE UP (ref 43–77)
NRBC # BLD: 0 /100 WBCS — SIGNIFICANT CHANGE UP (ref 0–0)
NRBC # BLD: 0 /100 WBCS — SIGNIFICANT CHANGE UP (ref 0–0)
NT-PROBNP SERPL-SCNC: 43 PG/ML — SIGNIFICANT CHANGE UP
PLATELET # BLD AUTO: 222 K/UL — SIGNIFICANT CHANGE UP (ref 150–400)
PLATELET # BLD AUTO: 257 K/UL — SIGNIFICANT CHANGE UP (ref 150–400)
POTASSIUM SERPL-MCNC: 3.7 MMOL/L — SIGNIFICANT CHANGE UP (ref 3.5–5.3)
POTASSIUM SERPL-MCNC: 4.1 MMOL/L — SIGNIFICANT CHANGE UP (ref 3.5–5.3)
POTASSIUM SERPL-SCNC: 3.7 MMOL/L — SIGNIFICANT CHANGE UP (ref 3.5–5.3)
POTASSIUM SERPL-SCNC: 4.1 MMOL/L — SIGNIFICANT CHANGE UP (ref 3.5–5.3)
PROT SERPL-MCNC: 7.4 G/DL — SIGNIFICANT CHANGE UP (ref 6–8.3)
PROT SERPL-MCNC: 7.8 G/DL — SIGNIFICANT CHANGE UP (ref 6.4–8.2)
PROTHROM AB SERPL-ACNC: 13.1 SEC — SIGNIFICANT CHANGE UP (ref 10.5–13.4)
RBC # BLD: 4.98 M/UL — SIGNIFICANT CHANGE UP (ref 4.2–5.8)
RBC # BLD: 5.21 M/UL — SIGNIFICANT CHANGE UP (ref 4.2–5.8)
RBC # FLD: 13.9 % — SIGNIFICANT CHANGE UP (ref 10.3–14.5)
RBC # FLD: 14.1 % — SIGNIFICANT CHANGE UP (ref 10.3–14.5)
SARS-COV-2 RNA SPEC QL NAA+PROBE: SIGNIFICANT CHANGE UP
SODIUM SERPL-SCNC: 133 MMOL/L — SIGNIFICANT CHANGE UP (ref 132–145)
SODIUM SERPL-SCNC: 140 MMOL/L — SIGNIFICANT CHANGE UP (ref 135–145)
TROPONIN I, HIGH SENSITIVITY RESULT: 11.7 NG/L — SIGNIFICANT CHANGE UP
TROPONIN T SERPL-MCNC: 0.01 NG/ML — SIGNIFICANT CHANGE UP (ref 0–0.01)
TROPONIN T SERPL-MCNC: <0.01 NG/ML — SIGNIFICANT CHANGE UP (ref 0–0.01)
WBC # BLD: 8.18 K/UL — SIGNIFICANT CHANGE UP (ref 3.8–10.5)
WBC # BLD: 9.42 K/UL — SIGNIFICANT CHANGE UP (ref 3.8–10.5)
WBC # FLD AUTO: 8.18 K/UL — SIGNIFICANT CHANGE UP (ref 3.8–10.5)
WBC # FLD AUTO: 9.42 K/UL — SIGNIFICANT CHANGE UP (ref 3.8–10.5)

## 2022-10-15 PROCEDURE — 99285 EMERGENCY DEPT VISIT HI MDM: CPT

## 2022-10-15 PROCEDURE — 93010 ELECTROCARDIOGRAM REPORT: CPT

## 2022-10-15 PROCEDURE — 99222 1ST HOSP IP/OBS MODERATE 55: CPT

## 2022-10-15 PROCEDURE — 71045 X-RAY EXAM CHEST 1 VIEW: CPT | Mod: 26,76

## 2022-10-15 RX ORDER — LOSARTAN/HYDROCHLOROTHIAZIDE 100MG-25MG
1 TABLET ORAL
Qty: 0 | Refills: 0 | DISCHARGE

## 2022-10-15 RX ORDER — AMOXICILLIN 250 MG/5ML
500 SUSPENSION, RECONSTITUTED, ORAL (ML) ORAL THREE TIMES A DAY
Refills: 0 | Status: DISCONTINUED | OUTPATIENT
Start: 2022-10-15 | End: 2022-10-16

## 2022-10-15 RX ORDER — CEPHALEXIN 500 MG
1 CAPSULE ORAL
Qty: 0 | Refills: 0 | DISCHARGE

## 2022-10-15 RX ORDER — LOSARTAN POTASSIUM 100 MG/1
100 TABLET, FILM COATED ORAL DAILY
Refills: 0 | Status: DISCONTINUED | OUTPATIENT
Start: 2022-10-15 | End: 2022-10-16

## 2022-10-15 RX ORDER — LOSARTAN POTASSIUM 100 MG/1
1 TABLET, FILM COATED ORAL
Qty: 0 | Refills: 0 | DISCHARGE

## 2022-10-15 RX ORDER — PANTOPRAZOLE SODIUM 20 MG/1
40 TABLET, DELAYED RELEASE ORAL
Refills: 0 | Status: DISCONTINUED | OUTPATIENT
Start: 2022-10-16 | End: 2022-10-16

## 2022-10-15 RX ORDER — ENOXAPARIN SODIUM 100 MG/ML
40 INJECTION SUBCUTANEOUS EVERY 24 HOURS
Refills: 0 | Status: DISCONTINUED | OUTPATIENT
Start: 2022-10-15 | End: 2022-10-16

## 2022-10-15 RX ORDER — VALSARTAN 80 MG/1
80 TABLET ORAL ONCE
Refills: 0 | Status: COMPLETED | OUTPATIENT
Start: 2022-10-15 | End: 2022-10-15

## 2022-10-15 RX ORDER — INFLUENZA VIRUS VACCINE 15; 15; 15; 15 UG/.5ML; UG/.5ML; UG/.5ML; UG/.5ML
0.7 SUSPENSION INTRAMUSCULAR ONCE
Refills: 0 | Status: DISCONTINUED | OUTPATIENT
Start: 2022-10-15 | End: 2022-10-16

## 2022-10-15 RX ORDER — ASPIRIN/CALCIUM CARB/MAGNESIUM 324 MG
81 TABLET ORAL DAILY
Refills: 0 | Status: DISCONTINUED | OUTPATIENT
Start: 2022-10-15 | End: 2022-10-16

## 2022-10-15 RX ORDER — HYDROCHLOROTHIAZIDE 25 MG
12.5 TABLET ORAL DAILY
Refills: 0 | Status: DISCONTINUED | OUTPATIENT
Start: 2022-10-15 | End: 2022-10-16

## 2022-10-15 RX ORDER — LANOLIN ALCOHOL/MO/W.PET/CERES
3 CREAM (GRAM) TOPICAL AT BEDTIME
Refills: 0 | Status: DISCONTINUED | OUTPATIENT
Start: 2022-10-15 | End: 2022-10-16

## 2022-10-15 RX ADMIN — VALSARTAN 80 MILLIGRAM(S): 80 TABLET ORAL at 10:57

## 2022-10-15 RX ADMIN — ENOXAPARIN SODIUM 40 MILLIGRAM(S): 100 INJECTION SUBCUTANEOUS at 17:11

## 2022-10-15 RX ADMIN — Medication 3 MILLIGRAM(S): at 22:26

## 2022-10-15 RX ADMIN — Medication 500 MILLIGRAM(S): at 22:27

## 2022-10-15 RX ADMIN — Medication 1.25 MILLIGRAM(S): at 11:56

## 2022-10-15 RX ADMIN — Medication 1.25 MILLIGRAM(S): at 08:54

## 2022-10-15 NOTE — ED PROVIDER NOTE - CLINICAL SUMMARY MEDICAL DECISION MAKING FREE TEXT BOX
Follow up Vascular Visit       Date of Service:10/08/21      Chief Complaint: right leg ulcer; chronic      Pt returns to [facility] with regards to their right leg ulcer chronic.  They arrive today alone. They are currently using sorbact; diapers; rolled gauze to the wounds. This is being done by the patient every 1-2 days. They are using nothing for compression; she does tolerate compression. They are feeling well today. Denies fevers, chills. No shortness of breath. Pertinent medical history includes multiple sclerosis, obesity, acquired lymphedema following lymphectomies, and venous insufficiency s/p Right Lower Extremity interventions. She has a history of recurrent lower limb cellulitis and  Left Lower Extremity ulcers which have healed. She also has an altered gait secondary to bilateral valgus deformity, left ankle contracture and bilateral flat feel in conjunction with multiple sclerosis. She is also following with ID; has been treated with multiple antibiotic for recent severe cellulitis infection in July. Continues on meropenem; added vancomycin for 2 weeks; she is not on topical gentamycin or acetic acid due to skin reaction (burning). Has follow up again 10/18.  She has been taken out of work. She needs paperwork completed for this today. Ultrasound done 7/22 negative for DVT.             Allergies:   Allergies   Allergen Reactions     Other Environmental Allergy Anaphylaxis     Bees/Wasps Stings     Adhesive Tape Unknown     Adhesive [Mecrylate] Unknown     Other reaction(s): sores     Vicodin [Hydrocodone-Acetaminophen] Nausea and Vomiting and Hives       Medications:   Current Outpatient Medications:      aspirin 81 mg chewable tablet, Take 81 mg by mouth every evening , Disp: , Rfl:      buPROPion (WELLBUTRIN SR) 150 MG 12 hr tablet, Take 150 mg by mouth every morning , Disp: , Rfl:      cholecalciferol, vitamin D3, 1,000 unit tablet, [CHOLECALCIFEROL, VITAMIN D3, 1,000  UNIT TABLET] Take 2,000 Units by mouth daily., Disp: , Rfl:      citalopram (CELEXA) 40 MG tablet, Take 40 mg by mouth every morning , Disp: , Rfl:      furosemide (LASIX) 20 MG tablet, Take 20 mg by mouth 2 times daily AM and afternoon (4PM), Disp: , Rfl:      HYDROcodone-acetaminophen (NORCO) 5-325 MG tablet, TAKE 1 TABLET BY MOUTH EVERY 8 HOURS AS NEEDED FOR 4 DAYS, Disp: , Rfl:      multivitamin therapeutic (THERAGRAN) tablet, [MULTIVITAMIN THERAPEUTIC (THERAGRAN) TABLET] Take 1 tablet by mouth daily., Disp: , Rfl:      multivitamin w/minerals (CENTRUM ADULTS) tablet, as directed, Disp: , Rfl:      naproxen sodium (ALEVE) 220 MG tablet, Take 440 mg by mouth daily as needed , Disp: , Rfl:      oxyCODONE (ROXICODONE) 5 MG tablet, Take 1 tablet (5 mg) by mouth every 8 hours as needed for breakthrough pain or severe pain, Disp: 9 tablet, Rfl: 0     pantoprazole (PROTONIX) 40 MG tablet, [PANTOPRAZOLE (PROTONIX) 40 MG TABLET] Take 40 mg by mouth daily., Disp: , Rfl:      potassium chloride ER (KLOR-CON M) 20 MEQ CR tablet, Take 20 mEq by mouth every evening, Disp: , Rfl:      potassium chloride SA (K-DUR,KLOR-CON) 20 MEQ tablet, Take 40 mEq by mouth every morning , Disp: , Rfl:      rOPINIRole (REQUIP) 1 MG tablet, Take 1 mg by mouth At Bedtime, Disp: , Rfl:      simvastatin (ZOCOR) 40 MG tablet, [SIMVASTATIN (ZOCOR) 40 MG TABLET] Take 40 mg by mouth bedtime., Disp: , Rfl:      spironolactone (ALDACTONE) 25 MG tablet, [SPIRONOLACTONE (ALDACTONE) 25 MG TABLET] Take 25 mg by mouth daily., Disp: , Rfl:      VANCOMYCIN HCL PO, 4 times daily, Disp: , Rfl:     History:   Past Medical History:   Diagnosis Date     Ankle contracture, left      Class 3 severe obesity due to excess calories without serious comorbidity with body mass index (BMI) of 45.0 to 49.9 in adult (H)      Combined gastric and duodenal ulcer      Depression      Dyslipidemia      Edema      Gait abnormality      GERD (gastroesophageal reflux disease)       Lymphedema of both lower extremities      Melanoma (H)     left upper arm     MS (multiple sclerosis) (H)      RLS (restless legs syndrome)      Skin ulcer of left lower leg (H)      Valgus deformity of both feet      Vitamin D deficiency        Physical Exam:    /72   Pulse 84   Temp 98.2  F (36.8  C)     General:  Patient presents to clinic in no apparent distress.  Head: normocephalic atraumatic  Psychiatric:  Alert and oriented x3.   Respiratory: unlabored breathing; no cough  Integumentary:  Skin is uniformly warm, dry and pink.    Wound #1 Location: right leg  Size: 15L x 45W x 0.1depth.  No sinus tract present, Wound base: slough; irregular  No undermining present. Wound is full thickness. There is heavy green drainage. Periwound: no denudement, erythema, induration, maceration or warmth.  Noted to have x5 baby diapers on today which were saturated with yellow/green fluid.     PICC Single Lumen 09/02/21 Right Brachial vein lateral (Active)   Number of days: 36       Wound Leg Other (comment);Ulceration (Active)   Number of days: 79       VASC Wound right lower leg (Active)   Pre Size Length 15 10/08/21 1000   Pre Size Width 45 10/08/21 1000   Pre Size Depth 0.2 10/08/21 1000   Pre Total Sq cm 675 10/08/21 1000   Number of days: 163            Circumferential volume measures:      Circumferential Measures 6/10/2021 6/16/2021 6/22/2021 6/29/2021 7/30/2021   Right just above MTP 25.5 23.6 24.8 24 23.3   Right Ankle 25.5 25.4 27 26 25.2   Right Widest Calf 61 56.6 58 59.5 61.0   Right Thigh Up 10cm 78 - - - 78.8   Right Knee to Ankle - - - - -   Left - just above MTP 23.5 22.6 23.8 23 24   Left Ankle 28 24.2 26.5 25.5 25   Left Widest Calf 54 53.6 54.6 53.5 51.5   Left Thigh Up 10cm 71 - - 68.5 77.2   Left Knee to Ankle - - - - -       Labs:    I personally reviewed the following lab results today and those on care everywhere, if indicated     CRP   Date Value Ref Range Status   10/06/2021 4.2 (H)  0.0-<0.8 mg/dL Final      Erythrocyte Sedimentation Rate   Date Value Ref Range Status   10/06/2021 71 (H) 0 - 20 mm/hr Final      Last Renal Panel:  Sodium   Date Value Ref Range Status   08/02/2021 140 136 - 145 mmol/L Final     Potassium   Date Value Ref Range Status   08/02/2021 4.9 3.5 - 5.0 mmol/L Final     Chloride   Date Value Ref Range Status   08/02/2021 103 98 - 107 mmol/L Final     Carbon Dioxide (CO2)   Date Value Ref Range Status   08/02/2021 24 22 - 31 mmol/L Final     Anion Gap   Date Value Ref Range Status   08/02/2021 13 5 - 18 mmol/L Final     Glucose   Date Value Ref Range Status   08/02/2021 83 70 - 125 mg/dL Final     Urea Nitrogen   Date Value Ref Range Status   10/06/2021 22 8 - 28 mg/dL Final     Creatinine   Date Value Ref Range Status   10/06/2021 0.77 0.60 - 1.10 mg/dL Final     GFR Estimate   Date Value Ref Range Status   10/06/2021 76 >60 mL/min/1.73m2 Final     Comment:     As of July 11, 2021, eGFR is calculated by the CKD-EPI creatinine equation, without race adjustment. eGFR can be influenced by muscle mass, exercise, and diet. The reported eGFR is an estimation only and is only applicable if the renal function is stable.   09/26/2020 >60 >60 mL/min/1.73m2 Final     Calcium   Date Value Ref Range Status   08/02/2021 10.0 8.5 - 10.5 mg/dL Final     Albumin   Date Value Ref Range Status   08/02/2021 3.4 (L) 3.5 - 5.0 g/dL Final      Lab Results   Component Value Date    WBC 8.0 10/06/2021     Lab Results   Component Value Date    RBC 4.12 10/06/2021     Lab Results   Component Value Date    HGB 11.0 10/06/2021     Lab Results   Component Value Date    HCT 33.3 10/06/2021     No components found for: MCT  Lab Results   Component Value Date    MCV 81 10/06/2021     Lab Results   Component Value Date    MCH 26.7 10/06/2021     Lab Results   Component Value Date    MCHC 33.0 10/06/2021     Lab Results   Component Value Date    RDW 14.6 10/06/2021     Lab Results   Component Value Date      10/06/2021      Lab Results   Component Value Date    A1C 5.9 06/23/2016      No results found for: TSH   No results found for: VITDT                Impression:  Encounter Diagnoses   Name Primary?     Chronic ulcer of right leg, with fat layer exposed (H) Yes     Acquired lymphedema of leg      Recurrent infection of skin      Scar condition and fibrosis of skin      Morbid obesity (H)      Venous insufficiency of both lower extremities      Venous hypertension of lower extremity, bilateral      Morbid obesity with BMI of 50.0-59.9, adult (H)      Other specified symptoms and signs involving the circulatory and respiratory systems                     8/27/2021 right leg            Are any of these wounds new today: No; Location: na    Assessment/Plan:          1. Debridement: After discussion of risk factors and verbal consent was obtained 2% Lidocaine HCL jelly was applied, under clean conditions, the right leg ulceration(s) were debrided using currette. Devitalized and nonviable tissue, along with any fibrin and slough, was removed to improve granulation tissue formation, stimulate wound healing, decrease overall bacteria load, disrupt biofilm formation and decrease edge senescence.  Total excisional debridement was 675 sq cm from the epidermis/dermis area and into the subcutaneous tissue with a depth of 0.1 cm.   Ulcers were improved afterwards and .  Measures were unchanged after debridement.       2.  Wound treatment: wound treatment will include irrigation and dressings to promote autolytic debridement which will include:we had a very adrianne discussion about the serious nature of her wounds and her situation; if she does not start wearing compression she will never heal; the antibiotics will not be effective she will be facing amputation of the right leg or systemic infection which could kill her; she does not tolerated gentamycin or acetic acid due to burning; will use sorbact; ABD (pt prefers  baby diapers); rolled gauze; will have her apply zinc oxide to periwound to protect from drainage; change twice per day; continue to work with ID on infections Stable            3. Edema: pt allowed us to wrap with tubular compression and short stretch; I sent a message to her lymphedema therapist to consider apply rosidal soft foam and then move to higher density foam to work on her fibrosis; encouraged her to use her lymphedema pump on the left leg and abdomen; we have got to get this fluid moving or we will not make any progress; encouraged elevation. Will obtain venous insufficiency ultrasound to see if there is any incompetence which could be further treated with RFA.  The compression wraps were applied today in clinic. Stable            4. Nutrition: focus on weight loss; high protein; low sodium           5. Offloading: na          6. Burning Pain: taking hydrocodone which is not effective; recommend neurontin or lyrica; sent message to PCP to consider; will obtain ABIs to ensure this is not an arterial insufficiency issue.     Patient will follow up with me in 4 weeks for reevaluation. They were instructed to call the clinic sooner with any signs or symptoms of infection or any further questions/concerns. Answered all questions.          Staci Junior DNP, RN, CNP, CWOCN, CFCN, CLT  [facility]   [phone]        This note was electronically signed by Staci Junior NP   Patient with gradual onset sensation of elevated blood pressure, with sensation of slow heart rate, found to be bradycardic in sinus lowest he has gone down to was 38 to 40 but then goes up to 50s and 60s.  Patient has no active chest pain but has noticed some chest discomfort over the course of the day symptoms started around 2:30 in the afternoon today after receiving a steroid injection with orthopedics in the hand on the right.  We will check all labs including troponin repeat EKG and likely admit if continues to be bradycardic in the ED patient is currently not on rate control medication is only on valsartan

## 2022-10-15 NOTE — H&P ADULT - PROBLEM SELECTOR PLAN 1
Hx of Uncontrolled BP, usually follows with Dr Domínguez, last seen 9/2020, now follow at [facility] and [facility]  -Initially on Lisinopril, D/C'd 2/2 to GI upset, switched to Amlodipine, now on home Losartan/HCTZ 100/12.5mg QD,   -Elevated SBP from 195 -187/70's, s/p Vasotec 1.2mg IV x 2 and Valsartan 800mg PO x 1 dose in ED, now improved /60's, now asymptomatic  c/w Losartan 100mg QD and HCTZ 12.5mg QD  -Goal 's  -Monitor BP closely

## 2022-10-15 NOTE — H&P ADULT - NSHPLABSRESULTS_GEN_ALL_CORE
13.6   8.18  )-----------( 222      ( 15 Oct 2022 04:26 )             42.9       10-15    133  |  103  |  18  ----------------------------<  113<H>  4.1   |  31  |  1.24    Ca    8.6      15 Oct 2022 04:26  Mg     2.0     10-15    TPro  7.8  /  Alb  3.3<L>  /  TBili  0.2  /  DBili  x   /  AST  20  /  ALT  22  /  AlkPhos  94  10-15      PT/INR - ( 15 Oct 2022 04:26 )   PT: 13.1 sec;   INR: 1.13          PTT - ( 15 Oct 2022 04:26 )  PTT:32.6 sec    CARDIAC MARKERS ( 15 Oct 2022 04:26 )  x     / x     / 195 U/L / x     / x

## 2022-10-15 NOTE — H&P ADULT - PROBLEM SELECTOR PLAN 4
Hx of Chronic Sinus Bradycardia, seen on EKG, HR 52 bpm at Dr. Domínguez office 9/2020\  -Admit EKG, SB @ 47bpm, repeat EKG SB @ 50bpm, no acute ischemic changes  -F/U EKG  -Pacer Pads at bedside   -Hold all AV Agata blockers  -EP consult in AM  -c/w tele monitor

## 2022-10-15 NOTE — ED ADULT NURSE NOTE - OBJECTIVE STATEMENT
Walk in with c/o midsternal CP since 9pm, non-radiating, +accompanying dizziness and HA. Pt states he took his BP at home and was Hypertensive at 194/94. Pt states he took his BP meds ( unsure of name) as prescribed but intermittent chest pressure and headache remained.

## 2022-10-15 NOTE — PATIENT PROFILE ADULT - FALL HARM RISK - HARM RISK INTERVENTIONS

## 2022-10-15 NOTE — ED ADULT NURSE REASSESSMENT NOTE - NS ED NURSE REASSESS COMMENT FT1
pt received hypertensive. see flow sheet for details. HR at 60s. Denies any pain/discomfort. MD Meraz made aware of findings. Pt given vasotec 1.25mg IVP as ordered. Will reassess BP.    used via language line: Antolin, [id number].

## 2022-10-15 NOTE — H&P ADULT - HISTORY OF PRESENT ILLNESS
Pt 67-year-old male Angolan-speaking only with a history of hypertension previously used to take nifedipine and recently in the last 3 months was switched to valsartan 100 mg per patient denies a history of stents, or coronary artery disease.  He does see a cardiology in [place] dr kathy green and gets most of his care at [facility].  Patient was there today for a steroid injection in the left third digit with an orthopedics doctor and notes that around 230 or 3:00 after the procedure he started getting feeding of his vision and felt like he was going to pass out, associated with mild chest tightness, and felt like his heartbeat was abnormal.  He denies headache, nausea vomiting, dizziness, syncope, shortness of breath, nausea vomiting diarrhea.  Incidentally he also notes that he recently started an antibiotic for upcoming dental work which was started 3 days ago.  He notes is 5 months 100 mg but does not recall the name.  He denies history of similar.  Denies recent trauma recent travel or sick contacts.  He is vaccinated for COVID.  He denies history of cardiac disease or heart failure.  Other than the valsartan he takes no other medications, no baby aspirin or other rate control medications.  Patient notes compliance with his medications    Upon admit, VS:  BP intialy > 200, improved 195/86, now 140/60's. SB, HR  45-59,  RR 21, Temp 98.4F, O2 Sat 99% RA  EKG, SB @ 47bpm, repeat EKG SB @ 50bpm, no acute ischemic changes    Labs significant for Trop I (11.7), , BNP 43, H/H (13.6/42.9), WBC 8.1, Plts 222, Na 133, K 4.1, BUN/Crt (18/1.24), Covid negative. CXR: no acute infiltrates  In ED, pt given Vasotec 1.2mg IV x 2 doses and Valsastan 80mg PO x 1 dose.  Pt is now transferred to cardiac telemetry for further BP management   Pt admitted to [facility] for work-up   67-y/o Greek speaking male, PMHx uncontrolled HTN, Chronic Sinus Bradycardia on EKG,  previously on Lisinopril, d/c'd 2/2 to GI upset, switched to Amlodipine, now on Losartan/HCTZ, initially follow with Cardiologist, Dr Domínguez (last seen 9/2020), now gets most of his medical care from [facility] and [facility]. Pt presenting today to [facility] for scheduled appointment with Ortho for steroid injection in L third digit, found post procedure with c/o blurred vision, abnormal heart beat, feeling like he was going to pass out, a/w mild chest tightness.  He denies SOB, headache, dizziness,  syncope, N/V/D or any other complaints at that time.  Of note:  per pt, he was he was recently started on prophylactic antibiotic, Keflex  for upcoming dental work which was started 3 days ago.      Upon arrival to [facility] ED, pt with /94, HR 74bpm, now noted /86 on arrival to [facility],  HR ranging 45-59bpm, RR 21, Temp 98.4F, O2 Sat 99% RA.  Initial admit EKG, SB @ 47bpm with repeat EKG SB @ 50bpm, no acute ischemic changes.  Labs significant for Trop I (11.7), , BNP 43, H/H (13.6/42.9), WBC 8.1, Plts 222, Na 133, K 4.1, BUN/Crt (18/1.24).   Covid negative. CXR: no acute infiltrates.  In ED, pt given Vasotec 1.2mg IV x 2 doses and Valsartan 80mg PO x 1 dose.  Pt is now transferred to [facility] cardiac telemetry for further BP management and R/O ACS.      History obtained from pt's daughter Jessica, [phone], reliable historian    67-y/o Northern Irish speaking male, PMHx uncontrolled HTN, Chronic Sinus Bradycardia on EKG,  previously on Lisinopril, d/c'd 2/2 to GI upset, switched to Amlodipine, now on Losartan/HCTZ, initially follow with Cardiologist, Dr Domínguez (last seen 9/2020), now gets most of his medical care from [facility] and [facility]. Pt presenting today to [facility] for scheduled appointment with Ortho for steroid injection in L third digit, found post procedure with c/o blurred vision, abnormal heart beat, feeling like he was going to pass out, a/w mild chest tightness.  He denies SOB, headache, dizziness,  syncope, N/V/D or any other complaints at that time.  Of note:  per pt, he was he was recently started on prophylactic antibiotic, Amoxicillin 500mg TID x 7 days for upcoming dental work, started 3 days ago.      Upon arrival to [facility] ED, VS: /94, HR 74bpm, now noted /86 on arrival to [facility], HR ranging 45-59bpm, RR 21, Temp 98.4F, O2 Sat 99% RA.  Initial admit EKG, SB @ 47bpm with repeat EKG SB @ 50bpm, no acute ischemic changes.  Labs significant for Trop I (11.7), , BNP 43, H/H (13.6/42.9), WBC 8.1, Plts 222, Na 133, K 4.1, BUN/Crt (18/1.24).   Covid negative. CXR: no acute infiltrates.  In ED, pt given Vasotec 1.2mg IV x 2 doses and Valsartan 80mg PO x 1 dose.  Pt is now transferred to [facility] cardiac telemetry for further BP management and R/O ACS.      History obtained from pt's daughter Jessica, [phone], reliable historian    67-y/o Slovak speaking male, PMHx uncontrolled HTN, Chronic Sinus Bradycardia on EKG,  previously on Lisinopril, d/c'd 2/2 to GI upset, switched to Amlodipine, now on Losartan/HCTZ, initially follow with Cardiologist, Dr Domínguez (last seen 9/2020), now gets most of his medical care from [facility] and [facility]. Pt presenting today to [facility] for scheduled appointment with Ortho for steroid injection in L third digit, found post procedure with c/o blurred vision, abnormal heart beat, feeling like he was going to pass out, a/w mild chest tightness.  He denies SOB, headache, dizziness,  syncope, N/V/D or any other complaints at that time.  Of note:  per pt, he was he was recently started on prophylactic antibiotic, Amoxicillin 500mg TID x 7 days for upcoming dental work, started 3 days ago.      Upon arrival to [facility] ED, VS: /94, HR 74bpm, now noted /86 on arrival to [facility], HR ranging 45-59bpm, RR 21, Temp 98.4F, O2 Sat 99% RA.  Initial admit EKG, SB @ 47bpm with repeat EKG SB @ 50bpm, no acute ischemic changes.  Labs significant for Trop I (11.7), , BNP 43, H/H (13.6/42.9), WBC 8.1, Plts 222, Na 133, K 4.1, BUN/Crt (18/1.24).   Covid negative. CXR: no acute infiltrates.  In ED, pt given Vasotec 1.2mg IV x 2 doses and Valsartan 80mg PO x 1 dose.  Pt is now transferred to [facility] cardiac telemetry forHypertensive Urgency, Symptomatic Bradycardia and R/O ACS.

## 2022-10-15 NOTE — ED PROVIDER NOTE - PROGRESS NOTE DETAILS
dr brary accepting for symptomatic bradycardia. Received pt in s/o from previous ED physician. , HR 60's. Pt takes ARB. IV enalapril ordered for systolic htn.

## 2022-10-15 NOTE — ED ADULT NURSE REASSESSMENT NOTE - NS ED NURSE REASSESS COMMENT FT1
at time of transport. pt currently asymptomatic, denies headache/dizziness, etc. Pt received a dose of vasotec 1.25mg IVP 15mins prior. MD Meraz made aware of status. Ok'd and stable for transfer.

## 2022-10-15 NOTE — PATIENT PROFILE ADULT - BILL PAYMENT
Speech Therapy    Patient not seen in therapy today.    Re-attempt plan: tomorrow    Additional details:  Approached for session with a focus on diet tolerance/trials for upgrade, however pt declines any intake at this time. RN reports no concerns re: tolerance of AM meal. Per chart review, temp/lungs stable. Will continue to follow per plan of care.        OBJECTIVE                     Therapy procedure time and total treatment time can be found documented on the Time Entry flowsheet   no

## 2022-10-15 NOTE — H&P ADULT - PROBLEM SELECTOR PLAN 2
Pt presenting to [facility] today for scheduled Left digit block with Ortho, post procedure with near syncope, CP and SB, symptoms now resolved,  Trops neg x 1, EKG with SB 40-50's. BNP 43  -Prior ECHO 9/2020: EF 55%, diastolic filling pattern indicates impaired relaxatio, mild to mod AR, mild TR  -Obtain ECHO for EF and structural  -On home Losartan/HCTZ 100/12.5mg QD  -Started on ASA 81mg QD, c/w Losartan 100mg QD and HCTA 12.5mg QD  -F/U CAD risk factor work-up  -Consider ischemic w/u if needed  -VS per routine, cont tele/pulse ox

## 2022-10-15 NOTE — ED ADULT TRIAGE NOTE - CHIEF COMPLAINT QUOTE
Walk in with c/o midsternal non-radiating chest pain since 9pm last night with HA and dizziness. No SOB, no n/v.

## 2022-10-15 NOTE — H&P ADULT - ASSESSMENT
67-y/o German speaking male, PMHx uncontrolled HTN, Chronic Sinus Bradycardia on EKG,  previously on Lisinopril, d/c'd 2/2 to GI upset, switched to Amlodipine, now on Losartan/HCTZ, initially follow with Cardiologist, Dr Domínguez (last seen 9/2020), now gets most of his medical care from [facility] and [facility]. Pt presenting today to [facility] for scheduled appointment with Ortho for steroid injection in L third digit, found post procedure with c/o blurred vision, abnormal heart beat, feeling like he was going to pass out, a/w mild chest tightness.  He denies SOB, headache, dizziness,  syncope, N/V/D or any other complaints at that time.  Of note:  per pt, he was he was recently started on prophylactic antibiotic, Keflex  for upcoming dental work which was started 3 days ago.      Upon arrival to [facility] ED, pt with /94, HR 74bpm, now noted /86 on arrival to [facility],  HR ranging 45-59bpm, RR 21, Temp 98.4F, O2 Sat 99% RA.  Initial admit EKG, SB @ 47bpm with repeat EKG SB @ 50bpm, no acute ischemic changes.  Labs significant for Trop I (11.7), , BNP 43, H/H (13.6/42.9), WBC 8.1, Plts 222, Na 133, K 4.1, BUN/Crt (18/1.24).   Covid negative. CXR: no acute infiltrates.  In ED, pt given Vasotec 1.2mg IV x 2 doses and Valsartan 80mg PO x 1 dose.  Pt is now transferred to [facility] cardiac telemetry for further BP management and R/O ACS.    67[facility]y/[facility] Syriac speaking [facility]x uncontrolled HTN, Chronic Sinus Bradycardia on EKG, transferred from [facility] for near syncopal episode, CP and symptomatic bradycardia. Trops I negative x 1, BNP WNL, labs otherwise unremarkable. Initial admit EKG, SB @ 47bpm, repeat EKG SB @ 50bpm, no acute ischemic changes.  Covid negative. CXR with no acute findings.   Pt is now transferred to [facility] cardiac telemetry for further BP management and R/O ACS.    67[facility] Maori speaking [facility] uncontrolled HTN, Chronic Sinus Bradycardia on EKG, transferred from [facility] for near syncopal episode, CP and symptomatic bradycardia. Trops I negative x 1, BNP WNL, labs otherwise unremarkable. Initial admit EKG, SB @ 47bpm, repeat EKG SB @ 50bpm, no acute ischemic changes.  Covid negative. CXR with no acute findings.   Pt is now transferred to [facility] cardiac telemetry for Hypertensive Urgency, Symptomatic Bradycardia and R/O ACS.

## 2022-10-15 NOTE — H&P ADULT - PROBLEM SELECTOR PLAN 5
DVT PPX: Lovenox SQ    F: NS/None  E: K<4, Mg<2  N: DASH/TLC/NPO     C: FULL CODE    Dispo: Pending clinical progression    Case discussed with Dr. Mak DVT PPX: Lovenox SQ    F: NS/None  E: K<4, Mg<2  N: DASH/TLC    C: FULL CODE    Dispo: Pending clinical progression    Case discussed with Dr. Mak

## 2022-10-15 NOTE — ED PROVIDER NOTE - OBJECTIVE STATEMENT
.  He67-year-old male Latvian-speaking only with a history of hypertension previously used to take nifedipine and recently in the last 3 months was switched to valsartan 100 mg per patient denies a history of stents, or coronary artery disease.  He does see a cardiology in the [place] and gets most of his care at [facility].  Patient was there today for a steroid injection in the left third digit with an orthopedics doctor and notes that around 230 or 3:00 after the procedure he started getting feeding of his vision and felt like he was going to pass out, associated with mild chest tightness, and felt like his heartbeat was abnormal.  He denies headache, nausea vomiting, dizziness, syncope, shortness of breath, nausea vomiting diarrhea.  Incidentally he also notes that he recently started an antibiotic for upcoming dental work which was started 3 days ago.  He notes is 5 months 100 mg but does not recall the name.  He denies history of similar.  Denies recent trauma recent travel or sick contacts.  He is vaccinated for COVID.  He denies history of cardiac disease or heart failure.  Other than the valsartan he takes no other medications, no baby aspirin or other rate control medications.  Patient notes compliance with his medications .  He67-year-old male Qatari-speaking only with a history of hypertension previously used to take nifedipine and recently in the last 3 months was switched to valsartan 100 mg per patient denies a history of stents, or coronary artery disease.  He does see a cardiology in [place] dr kathy green and gets most of his care at [facility].  Patient was there today for a steroid injection in the left third digit with an orthopedics doctor and notes that around 230 or 3:00 after the procedure he started getting feeding of his vision and felt like he was going to pass out, associated with mild chest tightness, and felt like his heartbeat was abnormal.  He denies headache, nausea vomiting, dizziness, syncope, shortness of breath, nausea vomiting diarrhea.  Incidentally he also notes that he recently started an antibiotic for upcoming dental work which was started 3 days ago.  He notes is 5 months 100 mg but does not recall the name.  He denies history of similar.  Denies recent trauma recent travel or sick contacts.  He is vaccinated for COVID.  He denies history of cardiac disease or heart failure.  Other than the valsartan he takes no other medications, no baby aspirin or other rate control medications.  Patient notes compliance with his medications

## 2022-10-15 NOTE — H&P ADULT - PROBLEM SELECTOR PLAN 3
No further near syncopal episodes since admission   -See trops/EKG above  -Orthostatics Stat and q24h  -Obtain ECHO  -Obtain Carotid duplex  -EP consult in AM  -F/U TSH   -Continuous tele/pulse ox, VS per routine, falls precaution

## 2022-10-15 NOTE — H&P ADULT - BMI (KG/M2)
From: Muriel Vera  To: Jacob Gann  Sent: 12/21/2021 6:41 PM CST  Subject: Nucjamin Gann,    I received a letter from my new insurance that said the PA they gave was only for one month. I can’t get this refilled until January 1st. That being said, I will run out of medication because the PA probably won’t even be started until January 3rd. I don’t know if we can try to get this process started early to expedite and try to not have so much lag in filling like I did for this month. It took over a week for the approval.     Thank you,  Muriel   22.8

## 2022-10-15 NOTE — ED ADULT NURSE NOTE - RADIATION
Pt presents to ED with c/o bilateral back pain and groin pain. Pt states she recently had an ultrasound and was told she has a right sided kidney stone and left sided hydronephrosis. Pt denies vomiting, diarrhea, fevers, hematuria, dysuria.    no radiation

## 2022-10-16 ENCOUNTER — TRANSCRIPTION ENCOUNTER (OUTPATIENT)
Age: 67
End: 2022-10-16

## 2022-10-16 VITALS
DIASTOLIC BLOOD PRESSURE: 77 MMHG | SYSTOLIC BLOOD PRESSURE: 138 MMHG | OXYGEN SATURATION: 97 % | RESPIRATION RATE: 20 BRPM | HEART RATE: 47 BPM

## 2022-10-16 LAB
A1C WITH ESTIMATED AVERAGE GLUCOSE RESULT: 5.6 % — SIGNIFICANT CHANGE UP (ref 4–5.6)
ANION GAP SERPL CALC-SCNC: 11 MMOL/L — SIGNIFICANT CHANGE UP (ref 5–17)
BUN SERPL-MCNC: 17 MG/DL — SIGNIFICANT CHANGE UP (ref 7–23)
CALCIUM SERPL-MCNC: 8.8 MG/DL — SIGNIFICANT CHANGE UP (ref 8.4–10.5)
CHLORIDE SERPL-SCNC: 104 MMOL/L — SIGNIFICANT CHANGE UP (ref 96–108)
CHOLEST SERPL-MCNC: 166 MG/DL — SIGNIFICANT CHANGE UP
CO2 SERPL-SCNC: 25 MMOL/L — SIGNIFICANT CHANGE UP (ref 22–31)
CREAT SERPL-MCNC: 1.05 MG/DL — SIGNIFICANT CHANGE UP (ref 0.5–1.3)
EGFR: 78 ML/MIN/1.73M2 — SIGNIFICANT CHANGE UP
ESTIMATED AVERAGE GLUCOSE: 114 MG/DL — SIGNIFICANT CHANGE UP (ref 68–114)
GLUCOSE SERPL-MCNC: 100 MG/DL — HIGH (ref 70–99)
HCT VFR BLD CALC: 42.3 % — SIGNIFICANT CHANGE UP (ref 39–50)
HCV AB S/CO SERPL IA: 0.03 S/CO — SIGNIFICANT CHANGE UP
HCV AB SERPL-IMP: SIGNIFICANT CHANGE UP
HDLC SERPL-MCNC: 45 MG/DL — SIGNIFICANT CHANGE UP
HGB BLD-MCNC: 13.6 G/DL — SIGNIFICANT CHANGE UP (ref 13–17)
LIPID PNL WITH DIRECT LDL SERPL: 109 MG/DL — HIGH
MAGNESIUM SERPL-MCNC: 2.2 MG/DL — SIGNIFICANT CHANGE UP (ref 1.6–2.6)
MCHC RBC-ENTMCNC: 26.8 PG — LOW (ref 27–34)
MCHC RBC-ENTMCNC: 32.2 GM/DL — SIGNIFICANT CHANGE UP (ref 32–36)
MCV RBC AUTO: 83.4 FL — SIGNIFICANT CHANGE UP (ref 80–100)
NON HDL CHOLESTEROL: 121 MG/DL — SIGNIFICANT CHANGE UP
NRBC # BLD: 0 /100 WBCS — SIGNIFICANT CHANGE UP (ref 0–0)
PLATELET # BLD AUTO: 236 K/UL — SIGNIFICANT CHANGE UP (ref 150–400)
POTASSIUM SERPL-MCNC: 3.4 MMOL/L — LOW (ref 3.5–5.3)
POTASSIUM SERPL-SCNC: 3.4 MMOL/L — LOW (ref 3.5–5.3)
RBC # BLD: 5.07 M/UL — SIGNIFICANT CHANGE UP (ref 4.2–5.8)
RBC # FLD: 14.1 % — SIGNIFICANT CHANGE UP (ref 10.3–14.5)
SODIUM SERPL-SCNC: 140 MMOL/L — SIGNIFICANT CHANGE UP (ref 135–145)
TRIGL SERPL-MCNC: 59 MG/DL — SIGNIFICANT CHANGE UP
TROPONIN T SERPL-MCNC: 0.01 NG/ML — SIGNIFICANT CHANGE UP (ref 0–0.01)
TSH SERPL-MCNC: 1.17 UIU/ML — SIGNIFICANT CHANGE UP (ref 0.27–4.2)
WBC # BLD: 9.19 K/UL — SIGNIFICANT CHANGE UP (ref 3.8–10.5)
WBC # FLD AUTO: 9.19 K/UL — SIGNIFICANT CHANGE UP (ref 3.8–10.5)

## 2022-10-16 PROCEDURE — 99239 HOSP IP/OBS DSCHRG MGMT >30: CPT

## 2022-10-16 RX ORDER — POTASSIUM CHLORIDE 20 MEQ
40 PACKET (EA) ORAL ONCE
Refills: 0 | Status: COMPLETED | OUTPATIENT
Start: 2022-10-16 | End: 2022-10-16

## 2022-10-16 RX ADMIN — Medication 500 MILLIGRAM(S): at 12:04

## 2022-10-16 RX ADMIN — Medication 12.5 MILLIGRAM(S): at 06:37

## 2022-10-16 RX ADMIN — Medication 500 MILLIGRAM(S): at 06:38

## 2022-10-16 RX ADMIN — Medication 40 MILLIEQUIVALENT(S): at 08:30

## 2022-10-16 RX ADMIN — Medication 81 MILLIGRAM(S): at 11:57

## 2022-10-16 RX ADMIN — PANTOPRAZOLE SODIUM 40 MILLIGRAM(S): 20 TABLET, DELAYED RELEASE ORAL at 06:37

## 2022-10-16 RX ADMIN — LOSARTAN POTASSIUM 100 MILLIGRAM(S): 100 TABLET, FILM COATED ORAL at 06:37

## 2022-10-16 NOTE — DISCHARGE NOTE PROVIDER - NSDCDCMDCOMP_GEN_ALL_CORE
Patient had ED visit on 4/18/2021.  Prescribed Macrobid 1 capsule (100 mg) BID for 7 days. Follow up with  on 4/22/2021. Patient's urine culture showed >100,000 org/mL Enterococcus species A. Updated ; plan for this to be addressed at patient's upcoming appointment on 4/22/2021.     This document is complete and the patient is ready for discharge.

## 2022-10-16 NOTE — DISCHARGE NOTE PROVIDER - HOSPITAL COURSE
Dx: TREATMENT FOR STEMI or HEART FAILURE THIS ADMISSION: YES/NO            If Yes to STEMI or Heart Failure: 7 day Follow-Up Appointment Made: Yes/No, Yes: Date/Time; IF No, why not?           Cardiac Rehab Indications (STEMI/NSTEMI/ACS/Unstable Angina/CHF/Chronic Stable Angina/Heart Surgery (CABG,Valve)/Post PCI):            *Education on benefits of Cardiac Rehab provided to patient: Yes         *Referral and Prescription Given for Cardiac Rehab: Yes/No.  If No, Why Not?  (see reasons below)         *Pt given list of locations & instructed to contact their insurance company to review list of participating providers. Yes          *Pt instructed to bring Cardiac Rehab prescription with them to Cardiology Follow up appointment for assistance with enrollment: Yes    (Reasons for No Cardiac Rehab Referral Rx - must document 1 or more options):                Patient Refused            Medical Reason: ex: needs Home Care, Home PT, severe or symptomatic AS            Patient lacks medical coverage for Cardiac Rehab            Pt discharged to Nursing Care/[facility]/Long term Care Facility            Patient Lacks Transportation or no cardiac rehab within 60 minutes driving range            Patient already participates in Cardiac Rehab            Other: (provide details) ex: Pt discharged to Hospice; prescription printer not working & pt was instructed to obtain Rx from outpt Cardiologist.    AMI: Beta Blocker Prescribed: Yes/No. If no, Why not?            ACE-I/ARB Prescribed: Yes/No. If no, Why not? ex: Entresto prescribed, Not indicated at this time, worsening renal function  CHF: Beta Blocker Prescribed: Yes/No.  If No, Why Not?           ACE-I/ARB/Entresto Prescribed: Yes/No.  If No, Why Not? ex: hypotension, worsening renal function  Statin Prescribed (STEMI/NSTEMI/ACS/UA &/OR Post PCI this admission):  Yes/No; If No, No Statin Prescribed due to______  DAPT Post PCI: Prescriptions for Aspirin/Plavix/Brilinta/Effient e-prescribed to patient's pharmacy: Yes/No__.                *No Aspirin/Plavix/Brilinta/Effient prescribed due to ___.       67-y/o Kinyarwanda speaking male Tx [facility], PMHx uncontrolled HTN, Chronic Sinus Bradycardia on EKG, transferred from [facility] for near syncopal episode, CP and symptomatic bradycardia. Trops I negative x 1, BNP WNL, labs otherwise unremarkable. Initial admit EKG, SB @ 47bpm, repeat EKG SB @ 50bpm, no acute ischemic changes.  Covid negative. CXR with no acute findings.   Pt is now transferred to [facility] cardiac telemetry for Hypertensive Urgency, Symptomatic Bradycardia and R/O ACS.  Troponin negative x3, patient w/ improvement in his BP and symptoms and stable for discharge home.    1) Hypertensive urgency.   ·  Plan: Hx of Uncontrolled BP, usually follows with Dr Domínguez, last seen 9/2020, now follow at [facility] and [facility]  -Initially on Lisinopril, D/C'd 2/2 to GI upset, switched to Amlodipine, now on home Losartan/HCTZ 100/12.5mg QD,   -Elevated SBP from 195 -187/70's, s/p Vasotec 1.2mg IV x 2 and Valsartan 800mg PO x 1 dose in ED, now improved /60's, now asymptomatic  c/w Losartan 100mg QD and HCTZ 12.5mg QD  -Goal 's  - Discharge on home med     2) Chest pain. RESOLVED   ·  Plan: Pt presenting to [facility] today for scheduled Left digit block with Ortho, post procedure with near syncope, CP and SB, symptoms now resolved,  Trops neg x 1, EKG with SB 40-50's. BNP 43  -Prior ECHO 9/2020: EF 55%, diastolic filling pattern indicates impaired relaxatio, mild to mod AR, mild TR  -On home Losartan/HCTZ 100/12.5mg QD  - c/w Losartan 100mg QD and HCTZ 12.5mg QD  - Outpatient follow-up with cardiologist and outpatient echo     3)  Near syncope.   ·  Plan: No further near syncopal episodes since admission. Patient denies light headedness, dizziness, or heart palpitations.   - TTE outpatient   - Outpatient follow-up with cardiology for possible Holter monitor for evaluation of chronic bradycardia     4) Sinus bradycardia.   ·  Plan: Hx of Chronic Sinus Bradycardia, seen on EKG, HR 52 bpm at Dr. Domínguez office 9/2020\  -Admit EKG, SB @ 47bpm, repeat EKG SB @ 50bpm, no acute ischemic changes  - Outpatient follow-up with cardiology for possible Holter monitor for evaluation of chronic bradycardia     #Upcoming dental procedure:   - Will continue with Amoxicillin for 4 days (total 7 days) prior to dental procedure patient has outpatient coming up.    Dx: TREATMENT FOR STEMI or HEART FAILURE THIS ADMISSION: YES/NO            If Yes to STEMI or Heart Failure: 7 day Follow-Up Appointment Made: Yes/No, Yes: Date/Time; IF No, why not?           Cardiac Rehab Indications (STEMI/NSTEMI/ACS/Unstable Angina/CHF/Chronic Stable Angina/Heart Surgery (CABG,Valve)/Post PCI):            *Education on benefits of Cardiac Rehab provided to patient: Yes         *Referral and Prescription Given for Cardiac Rehab: Yes/No.  If No, Why Not?  (see reasons below)         *Pt given list of locations & instructed to contact their insurance company to review list of participating providers. Yes          *Pt instructed to bring Cardiac Rehab prescription with them to Cardiology Follow up appointment for assistance with enrollment: Yes    (Reasons for No Cardiac Rehab Referral Rx - must document 1 or more options):                Patient Refused            Medical Reason: ex: needs Home Care, Home PT, severe or symptomatic AS            Patient lacks medical coverage for Cardiac Rehab            Pt discharged to Nursing Care/[facility]/Long term Care Facility            Patient Lacks Transportation or no cardiac rehab within 60 minutes driving range            Patient already participates in Cardiac Rehab            Other: (provide details) ex: Pt discharged to Hospice; prescription printer not working & pt was instructed to obtain Rx from outpt Cardiologist.    AMI: Beta Blocker Prescribed: Yes/No. If no, Why not?            ACE-I/ARB Prescribed: Yes/No. If no, Why not? ex: Entresto prescribed, Not indicated at this time, worsening renal function  CHF: Beta Blocker Prescribed: Yes/No.  If No, Why Not?           ACE-I/ARB/Entresto Prescribed: Yes/No.  If No, Why Not? ex: hypotension, worsening renal function  Statin Prescribed (STEMI/NSTEMI/ACS/UA &/OR Post PCI this admission):  Yes/No; If No, No Statin Prescribed due to______  DAPT Post PCI: Prescriptions for Aspirin/Plavix/Brilinta/Effient e-prescribed to patient's pharmacy: Yes/No__.                *No Aspirin/Plavix/Brilinta/Effient prescribed due to ___.       67-y/o Albanian speaking male Tx [facility], PMHx uncontrolled HTN, Chronic Sinus Bradycardia on EKG, transferred from [facility] for near syncopal episode, CP and symptomatic bradycardia. Trops I negative x 1, BNP WNL, labs otherwise unremarkable. Initial admit EKG, SB @ 47bpm, repeat EKG SB @ 50bpm, no acute ischemic changes.  Covid negative. CXR with no acute findings.   Pt is now transferred to [facility] cardiac telemetry for Hypertensive Urgency, Symptomatic Bradycardia and R/O ACS.  Troponin negative x3, patient w/ improvement in his BP and symptoms and stable for discharge home.    1) Hypertensive urgency.   ·  Plan: Hx of Uncontrolled BP, usually follows with Dr Domínguez, last seen 9/2020, now follow at [facility] and [facility]  -Initially on Lisinopril, D/C'd 2/2 to GI upset, switched to Amlodipine, now on home Losartan/HCTZ 100/12.5mg QD,   -Elevated SBP from 195 -187/70's, s/p Vasotec 1.2mg IV x 2 and Valsartan 800mg PO x 1 dose in ED, now improved /60's, now asymptomatic  c/w Losartan 100mg QD and HCTZ 12.5mg QD  -Goal 's  - Discharge on home med     2) Chest pain. RESOLVED   ·  Plan: Pt presenting to [facility] today for scheduled Left digit block with Ortho, post procedure with near syncope, CP and SB, symptoms now resolved,  Trops neg x 1, EKG with SB 40-50's. BNP 43  -Prior ECHO 9/2020: EF 55%, diastolic filling pattern indicates impaired relaxatio, mild to mod AR, mild TR  -On home Losartan/HCTZ 100/12.5mg QD  - c/w Losartan 100mg QD and HCTZ 12.5mg QD  - Outpatient follow-up with cardiologist and outpatient echo     3)  Near syncope.   ·  Plan: No further near syncopal episodes since admission. Patient denies light headedness, dizziness, or heart palpitations.   - TTE outpatient   - Outpatient follow-up with cardiology for possible Holter monitor for evaluation of chronic bradycardia     4) Sinus bradycardia.   ·  Plan: Hx of Chronic Sinus Bradycardia, seen on EKG, HR 52 bpm at Dr. Domínguez office 9/2020\  -Admit EKG, SB @ 47bpm, repeat EKG SB @ 50bpm, no acute ischemic changes  - Outpatient follow-up with cardiology for possible Holter monitor for evaluation of chronic bradycardia     #Upcoming dental procedure:   - Will continue with Amoxicillin for 4 days (total 7 days) prior to dental procedure patient has outpatient coming up.    Dx: TREATMENT FOR STEMI or HEART FAILURE THIS ADMISSION: YES/NO            If Yes to STEMI or Heart Failure: 7 day Follow-Up Appointment Made: Yes/No, Yes: Date/Time; IF No, why not?           Cardiac Rehab Indications (STEMI/NSTEMI/ACS/Unstable Angina/CHF/Chronic Stable Angina/Heart Surgery (CABG,Valve)/Post PCI):            *Education on benefits of Cardiac Rehab provided to patient: Yes         *Referral and Prescription Given for Cardiac Rehab: Yes/No.  If No, Why Not?  (see reasons below)         *Pt given list of locations & instructed to contact their insurance company to review list of participating providers. Yes          *Pt instructed to bring Cardiac Rehab prescription with them to Cardiology Follow up appointment for assistance with enrollment: Yes    (Reasons for No Cardiac Rehab Referral Rx - must document 1 or more options):                Patient Refused            Medical Reason: ex: needs Home Care, Home PT, severe or symptomatic AS            Patient lacks medical coverage for Cardiac Rehab            Pt discharged to Nursing Care/[facility]/Long term Care Facility            Patient Lacks Transportation or no cardiac rehab within 60 minutes driving range            Patient already participates in Cardiac Rehab            Other: (provide details) ex: Pt discharged to Hospice; prescription printer not working & pt was instructed to obtain Rx from outpt Cardiologist.    AMI: Beta Blocker Prescribed: Yes/No. If no, Why not?            ACE-I/ARB Prescribed: Yes/No. If no, Why not? ex: Entresto prescribed, Not indicated at this time, worsening renal function  CHF: Beta Blocker Prescribed: Yes/No.  If No, Why Not?           ACE-I/ARB/Entresto Prescribed: Yes/No.  If No, Why Not? ex: hypotension, worsening renal function  Statin Prescribed (STEMI/NSTEMI/ACS/UA &/OR Post PCI this admission):  Yes/No; If No, No Statin Prescribed due to______  DAPT Post PCI: Prescriptions for Aspirin/Plavix/Brilinta/Effient e-prescribed to patient's pharmacy: Yes/No__.                *No Aspirin/Plavix/Brilinta/Effient prescribed due to ___.

## 2022-10-16 NOTE — PROGRESS NOTE ADULT - ASSESSMENT
67[facility] [place] [facility] uncontrolled HTN, Chronic Sinus Bradycardia on EKG, transferred from [facility] for near syncopal episode, CP and symptomatic bradycardia. Trops I negative x 1, BNP WNL, labs otherwise unremarkable. Initial admit EKG, SB @ 47bpm, repeat EKG SB @ 50bpm, no acute ischemic changes.  Covid negative. CXR with no acute findings.   Pt is now transferred to [facility] cardiac telemetry for Hypertensive Urgency, Symptomatic Bradycardia and R/O ACS.

## 2022-10-16 NOTE — PROGRESS NOTE ADULT - PROBLEM SELECTOR PLAN 5
DVT PPX: Lovenox SQ    F: NS/None  E: K<4, Mg<2  N: DASH/TLC    C: FULL CODE    Dispo: 5L, pending PT   Case discussed with Dr. Mak DVT PPX: Lovenox SQ    F: NS/None  E: K<4, Mg<2  N: DASH/TLC    C: FULL CODE    Dispo: 5L, pending PT

## 2022-10-16 NOTE — DISCHARGE NOTE PROVIDER - PROVIDER TOKENS
PROVIDER:[TOKEN:[[id number]],FOLLOWUP:[2 weeks]],PROVIDER:[TOKEN:[[id number]],FOLLOWUP:[2 weeks]]

## 2022-10-16 NOTE — DISCHARGE NOTE PROVIDER - NSDCCPCAREPLAN_GEN_ALL_CORE_FT
PRINCIPAL DISCHARGE DIAGNOSIS  Diagnosis: Chest pain  Assessment and Plan of Treatment: You presented with chest pain, now resolved. Your cardiac enzymes were all normal and your EKG was negative for any ischemic changes.   Please follow-up with your cardiologist and primary care physician as an outpatient for further work-up. You will need a repeat echocardiogram and possible a Holter monitor as an outpatient.  Please call your doctor or come back to the hospital if you develop symptoms such as but not limited to: worsening chest pain, difficulty breathing, heart palpitations, shortness of breath, dizziness, or light headedness.      SECONDARY DISCHARGE DIAGNOSES  Diagnosis: HTN (hypertension)  Assessment and Plan of Treatment: Hypertension is the medical term for high blood pressure. Blood pressure refers to the pressure that blood applies to the inner walls of the arteries. Arteries carry blood from the heart to other organs and parts of the body. Untreated high blood pressure increases the strain on the heart and arteries, eventually causing organ damage. High blood pressure increases the risk of heart failure, heart attack (myocardial infarction), stroke, and kidney failure. High blood pressure does not usually cause any symptoms. Treatment of hypertension usually begins with lifestyle changes. Making these lifestyle changes involves little or no risk. Recommended changes often include reducing the amount of salt in your diet, losing weight if you are overweight or obese, avoiding drinking too much alcohol, stopping smoking and exercising at least 30 minutes per day most days of the week.   Please continue taking your home medication Losartan/HCTZ 100/12.5 mg daily as prescribed.  Please call your doctor or come back to the hospital if you develop symptoms such as light headedness, dizziness, nausea, vomiting, chest pain, or difficulty breathing.    Diagnosis: Chronic sinus bradycardia  Assessment and Plan of Treatment: You havea history of chronic bradycardia (slow heart rate), but your heart rate appears to be at baseline as compared to prior. Please follow-up with cardiology as an outpatient for possible Holter monitor for evaluation of chronic bradycardia.  Call your doctor or come back to the hospital if you develop symptoms such as light headedness, dizziness, syncope, heart palpitations, or chest pain.    Diagnosis: Near syncope  Assessment and Plan of Treatment: You almost passed out (near syncope) before coming to the hospital, but you have not had any other episodes since. Please follow-up with your cardiologist and primary care physician as an outpatient for repeat electrocardiogram and possible Holter monitor for further evaluation.  Please call your doctor or come back to the hospital if you have any more syncopal episodes.

## 2022-10-16 NOTE — PROGRESS NOTE ADULT - PROBLEM SELECTOR PLAN 3
No further near syncopal episodes since admission. Patient denies light headedness, dizziness, or heart palpitations.   -See trops/EKG above  -F/u orthostatics   -Obtain ECHO  -Obtain Carotid duplex  - Consider EP consult, although patient has chronic bradycardia   -Continuous tele/pulse ox, VS per routine, falls precaution

## 2022-10-16 NOTE — PROGRESS NOTE ADULT - SUBJECTIVE AND OBJECTIVE BOX
SUBJECTIVE / INTERVAL HPI: Patient seen and examined at bedside.  used. Patient reports feeling well this morning with no new complaints. He endorsed chest discomfort yesterday, but reports that has completely resolved. Denies light headedness, dizziness, chest pain, SOB, difficulty breathing, or heart palpitations.     VITAL SIGNS:  Vital Signs Last 24 Hrs  T(C): 36.7 (16 Oct 2022 05:00), Max: 36.9 (15 Oct 2022 13:47)  T(F): 98 (16 Oct 2022 05:00), Max: 98.4 (15 Oct 2022 13:47)  HR: 49 (16 Oct 2022 08:51) (44 - 64)  BP: 143/103 (16 Oct 2022 08:51) (139/65 - 205/82)  BP(mean): 119 (16 Oct 2022 08:51) (86 - 119)  RR: 21 (16 Oct 2022 08:51) (14 - 21)  SpO2: 97% (16 Oct 2022 08:51) (96% - 99%)    Parameters below as of 16 Oct 2022 08:51  Patient On (Oxygen Delivery Method): room air        PHYSICAL EXAM:    General: resting in bed, in NAD   HEENT: NC/AT; PERRL, anicteric sclera; MMM  Neck: supple  Cardiovascular: +S1/S2; RRR. No murmurs, rubs, or gallops   Respiratory: CTA B/L; no W/R/R  Gastrointestinal: soft, obese, NT/ND; +BSx4  Extremities: WWP; no edema, clubbing or cyanosis  Vascular: 2+ radial, DP/PT pulses B/L  Neurological: AAOx3; no focal deficits    MEDICATIONS:  MEDICATIONS  (STANDING):  amoxicillin 500 milliGRAM(s) Oral three times a day  aspirin enteric coated 81 milliGRAM(s) Oral daily  enoxaparin Injectable 40 milliGRAM(s) SubCutaneous every 24 hours  hydrochlorothiazide 12.5 milliGRAM(s) Oral daily  influenza  Vaccine (HIGH DOSE) 0.7 milliLiter(s) IntraMuscular once  losartan 100 milliGRAM(s) Oral daily  melatonin 3 milliGRAM(s) Oral at bedtime  pantoprazole    Tablet 40 milliGRAM(s) Oral before breakfast    MEDICATIONS  (PRN):      ALLERGIES:  Allergies    No Known Allergies    Intolerances        LABS:                        13.6   9.19  )-----------( 236      ( 16 Oct 2022 06:06 )             42.3     10-16    140  |  104  |  17  ----------------------------<  100<H>  3.4<L>   |  25  |  1.05    Ca    8.8      16 Oct 2022 06:06  Mg     2.2     10-16    TPro  7.4  /  Alb  3.9  /  TBili  0.3  /  DBili  x   /  AST  21  /  ALT  14  /  AlkPhos  95  10-15    PT/INR - ( 15 Oct 2022 04:26 )   PT: 13.1 sec;   INR: 1.13          PTT - ( 15 Oct 2022 04:26 )  PTT:32.6 sec    CAPILLARY BLOOD GLUCOSE          RADIOLOGY & ADDITIONAL TESTS: Reviewed.

## 2022-10-16 NOTE — PROGRESS NOTE ADULT - PROBLEM SELECTOR PLAN 4
Hx of Chronic Sinus Bradycardia, seen on EKG, HR 52 bpm at Dr. Domínguez office 9/2020\  -Admit EKG, SB @ 47bpm, repeat EKG SB @ 50bpm, no acute ischemic changes  -Pacer Pads at bedside   -Hold all AV Agata blockers  -Consider EP consult, although patient has a history of sinus jameel   -c/w tele monitor    #Upcoming dental procedure:   - Will continue with Amoxicillin for 4 days (total 7 days) prior to dental procedure patient has outpatient coming up

## 2022-10-16 NOTE — PROGRESS NOTE ADULT - PROBLEM SELECTOR PLAN 1
Hx of Uncontrolled BP, usually follows with Dr Domínguez, last seen 9/2020, now follow at [facility]  -Initially on Lisinopril, D/C'd 2/2 to GI upset, switched to Amlodipine, now on home Losartan/HCTZ 100/12.5mg QD,   -Elevated SBP from 195 -187/70's, s/p Vasotec 1.2mg IV x 2 and Valsartan 800mg PO x 1 dose in ED, now improved /60's, now asymptomatic  c/w Losartan 100mg QD and HCTZ 12.5mg QD  -Goal 's  -Monitor BP closely

## 2022-10-16 NOTE — DISCHARGE NOTE NURSING/CASE MANAGEMENT/SOCIAL WORK - NSDCPEFALRISK_GEN_ALL_CORE
For information on Fall & Injury Prevention, visit: [email or web address] OR  [email or web address] OR  https://www.cdc.gov/steadi/patient.html

## 2022-10-16 NOTE — DISCHARGE NOTE NURSING/CASE MANAGEMENT/SOCIAL WORK - PATIENT PORTAL LINK FT
You can access the FollowMyHealth Patient Portal offered by [facility] by registering at the following website: [email or web address]. By joining [employer]’s FollowMyHealth portal, you will also be able to view your health information using other applications (apps) compatible with our system.

## 2022-10-16 NOTE — PROGRESS NOTE ADULT - PROBLEM SELECTOR PLAN 2
Pt presenting to [facility] today for scheduled Left digit block with Ortho, post procedure with near syncope, CP and SB, symptoms now resolved,  Trops neg x 1, EKG with SB 40-50's. BNP 43  -Prior ECHO 9/2020: EF 55%, diastolic filling pattern indicates impaired relaxatio, mild to mod AR, mild TR  -Obtain ECHO for EF and structural  -On home Losartan/HCTZ 100/12.5mg QD  -Started on ASA 81mg QD, c/w Losartan 100mg QD and HCTZ 12.5mg QD  -F/U CAD risk factor work-up  -Consider ischemic w/u if needed  -VS per routine, cont tele/pulse ox

## 2022-10-16 NOTE — DISCHARGE NOTE PROVIDER - NSDCFUADDAPPT_GEN_ALL_CORE_FT
Please follow-up with cardiology and electrophysiology within 1-2 weeks of discharge for further evaluation

## 2022-10-16 NOTE — DISCHARGE NOTE PROVIDER - CARE PROVIDER_API CALL
Mable Mclean)  Cardiology; Internal Medicine  [address], [address] [place] [address]  Phone: [phone]  Fax: [phone]  Follow Up Time: 2 weeks    Clif Paz)  Cardiac Electrophysiology; Cardiovascular Disease; Internal Medicine  [address] [address]  Phone: [phone]  Fax: [phone]  Follow Up Time: 2 weeks

## 2022-10-25 DIAGNOSIS — R73.03 PREDIABETES: ICD-10-CM

## 2022-10-25 DIAGNOSIS — I16.0 HYPERTENSIVE URGENCY: ICD-10-CM

## 2022-10-25 DIAGNOSIS — I10 ESSENTIAL (PRIMARY) HYPERTENSION: ICD-10-CM

## 2022-10-25 DIAGNOSIS — R55 SYNCOPE AND COLLAPSE: ICD-10-CM

## 2022-10-25 DIAGNOSIS — T38.0X5A ADVERSE EFFECT OF GLUCOCORTICOIDS AND SYNTHETIC ANALOGUES, INITIAL ENCOUNTER: ICD-10-CM

## 2022-10-25 DIAGNOSIS — R07.9 CHEST PAIN, UNSPECIFIED: ICD-10-CM

## 2022-10-25 DIAGNOSIS — R00.1 BRADYCARDIA, UNSPECIFIED: ICD-10-CM

## 2022-11-08 PROCEDURE — 87635 SARS-COV-2 COVID-19 AMP PRB: CPT

## 2022-11-08 PROCEDURE — 83880 ASSAY OF NATRIURETIC PEPTIDE: CPT

## 2022-11-08 PROCEDURE — 83735 ASSAY OF MAGNESIUM: CPT

## 2022-11-08 PROCEDURE — 71046 X-RAY EXAM CHEST 2 VIEWS: CPT

## 2022-11-08 PROCEDURE — 86803 HEPATITIS C AB TEST: CPT

## 2022-11-08 PROCEDURE — 83036 HEMOGLOBIN GLYCOSYLATED A1C: CPT

## 2022-11-08 PROCEDURE — 96374 THER/PROPH/DIAG INJ IV PUSH: CPT

## 2022-11-08 PROCEDURE — 80053 COMPREHEN METABOLIC PANEL: CPT

## 2022-11-08 PROCEDURE — 85025 COMPLETE CBC W/AUTO DIFF WBC: CPT

## 2022-11-08 PROCEDURE — 85027 COMPLETE CBC AUTOMATED: CPT

## 2022-11-08 PROCEDURE — 85610 PROTHROMBIN TIME: CPT

## 2022-11-08 PROCEDURE — 99285 EMERGENCY DEPT VISIT HI MDM: CPT | Mod: 25

## 2022-11-08 PROCEDURE — 85730 THROMBOPLASTIN TIME PARTIAL: CPT

## 2022-11-08 PROCEDURE — 82550 ASSAY OF CK (CPK): CPT

## 2022-11-08 PROCEDURE — 80061 LIPID PANEL: CPT

## 2022-11-08 PROCEDURE — 84484 ASSAY OF TROPONIN QUANT: CPT

## 2022-11-08 PROCEDURE — 36415 COLL VENOUS BLD VENIPUNCTURE: CPT

## 2022-11-08 PROCEDURE — 71045 X-RAY EXAM CHEST 1 VIEW: CPT

## 2022-11-08 PROCEDURE — 84443 ASSAY THYROID STIM HORMONE: CPT

## 2022-11-08 PROCEDURE — 80048 BASIC METABOLIC PNL TOTAL CA: CPT

## 2022-12-15 NOTE — ED PROVIDER NOTE - DATE/TIME 1
limited prior nutrition related knowledge re: HD, DM increased physiological demand of nutrient in setting of brain injury, kidney failure 15-Oct-2022 06:55

## 2023-03-21 ENCOUNTER — EMERGENCY (EMERGENCY)
Facility: HOSPITAL | Age: 68
LOS: 1 days | Discharge: HOME | End: 2023-03-21
Attending: EMERGENCY MEDICINE | Admitting: EMERGENCY MEDICINE
Payer: MEDICARE

## 2023-03-21 VITALS
HEIGHT: 67 IN | WEIGHT: 195.11 LBS | RESPIRATION RATE: 15 BRPM | TEMPERATURE: 98 F | HEART RATE: 60 BPM | OXYGEN SATURATION: 100 % | DIASTOLIC BLOOD PRESSURE: 70 MMHG | SYSTOLIC BLOOD PRESSURE: 117 MMHG

## 2023-03-21 VITALS
SYSTOLIC BLOOD PRESSURE: 115 MMHG | OXYGEN SATURATION: 99 % | RESPIRATION RATE: 16 BRPM | TEMPERATURE: 98 F | HEART RATE: 53 BPM | DIASTOLIC BLOOD PRESSURE: 73 MMHG

## 2023-03-21 LAB
ALBUMIN SERPL ELPH-MCNC: 3.1 G/DL — LOW (ref 3.4–5)
ALP SERPL-CCNC: 82 U/L — SIGNIFICANT CHANGE UP (ref 40–120)
ALT FLD-CCNC: 21 U/L — SIGNIFICANT CHANGE UP (ref 12–42)
ANION GAP SERPL CALC-SCNC: 6 MMOL/L — LOW (ref 9–16)
AST SERPL-CCNC: 27 U/L — SIGNIFICANT CHANGE UP (ref 15–37)
BASOPHILS # BLD AUTO: 0.03 K/UL — SIGNIFICANT CHANGE UP (ref 0–0.2)
BASOPHILS NFR BLD AUTO: 0.3 % — SIGNIFICANT CHANGE UP (ref 0–2)
BILIRUB SERPL-MCNC: 0.4 MG/DL — SIGNIFICANT CHANGE UP (ref 0.2–1.2)
BUN SERPL-MCNC: 10 MG/DL — SIGNIFICANT CHANGE UP (ref 7–23)
CALCIUM SERPL-MCNC: 8.7 MG/DL — SIGNIFICANT CHANGE UP (ref 8.5–10.5)
CHLORIDE SERPL-SCNC: 102 MMOL/L — SIGNIFICANT CHANGE UP (ref 96–108)
CO2 SERPL-SCNC: 30 MMOL/L — SIGNIFICANT CHANGE UP (ref 22–31)
CREAT SERPL-MCNC: 1.29 MG/DL — SIGNIFICANT CHANGE UP (ref 0.5–1.3)
EGFR: 61 ML/MIN/1.73M2 — SIGNIFICANT CHANGE UP
EOSINOPHIL # BLD AUTO: 0.12 K/UL — SIGNIFICANT CHANGE UP (ref 0–0.5)
EOSINOPHIL NFR BLD AUTO: 1.4 % — SIGNIFICANT CHANGE UP (ref 0–6)
FLUAV AG NPH QL: SIGNIFICANT CHANGE UP
FLUBV AG NPH QL: SIGNIFICANT CHANGE UP
GLUCOSE SERPL-MCNC: 119 MG/DL — HIGH (ref 70–99)
HCT VFR BLD CALC: 42.1 % — SIGNIFICANT CHANGE UP (ref 39–50)
HGB BLD-MCNC: 13.6 G/DL — SIGNIFICANT CHANGE UP (ref 13–17)
IMM GRANULOCYTES NFR BLD AUTO: 0.2 % — SIGNIFICANT CHANGE UP (ref 0–0.9)
LIDOCAIN IGE QN: 92 U/L — SIGNIFICANT CHANGE UP (ref 73–393)
LYMPHOCYTES # BLD AUTO: 3.13 K/UL — SIGNIFICANT CHANGE UP (ref 1–3.3)
LYMPHOCYTES # BLD AUTO: 35.2 % — SIGNIFICANT CHANGE UP (ref 13–44)
MCHC RBC-ENTMCNC: 27.8 PG — SIGNIFICANT CHANGE UP (ref 27–34)
MCHC RBC-ENTMCNC: 32.3 GM/DL — SIGNIFICANT CHANGE UP (ref 32–36)
MCV RBC AUTO: 86.1 FL — SIGNIFICANT CHANGE UP (ref 80–100)
MONOCYTES # BLD AUTO: 0.65 K/UL — SIGNIFICANT CHANGE UP (ref 0–0.9)
MONOCYTES NFR BLD AUTO: 7.3 % — SIGNIFICANT CHANGE UP (ref 2–14)
NEUTROPHILS # BLD AUTO: 4.93 K/UL — SIGNIFICANT CHANGE UP (ref 1.8–7.4)
NEUTROPHILS NFR BLD AUTO: 55.6 % — SIGNIFICANT CHANGE UP (ref 43–77)
NRBC # BLD: 0 /100 WBCS — SIGNIFICANT CHANGE UP (ref 0–0)
PLATELET # BLD AUTO: 251 K/UL — SIGNIFICANT CHANGE UP (ref 150–400)
POTASSIUM SERPL-MCNC: 3.6 MMOL/L — SIGNIFICANT CHANGE UP (ref 3.5–5.3)
POTASSIUM SERPL-SCNC: 3.6 MMOL/L — SIGNIFICANT CHANGE UP (ref 3.5–5.3)
PROT SERPL-MCNC: 7.7 G/DL — SIGNIFICANT CHANGE UP (ref 6.4–8.2)
RBC # BLD: 4.89 M/UL — SIGNIFICANT CHANGE UP (ref 4.2–5.8)
RBC # FLD: 13.2 % — SIGNIFICANT CHANGE UP (ref 10.3–14.5)
RSV RNA NPH QL NAA+NON-PROBE: SIGNIFICANT CHANGE UP
SARS-COV-2 RNA SPEC QL NAA+PROBE: SIGNIFICANT CHANGE UP
SODIUM SERPL-SCNC: 138 MMOL/L — SIGNIFICANT CHANGE UP (ref 132–145)
WBC # BLD: 8.88 K/UL — SIGNIFICANT CHANGE UP (ref 3.8–10.5)
WBC # FLD AUTO: 8.88 K/UL — SIGNIFICANT CHANGE UP (ref 3.8–10.5)

## 2023-03-21 PROCEDURE — 74177 CT ABD & PELVIS W/CONTRAST: CPT | Mod: 26

## 2023-03-21 PROCEDURE — 99284 EMERGENCY DEPT VISIT MOD MDM: CPT

## 2023-03-21 RX ORDER — CIPROFLOXACIN LACTATE 400MG/40ML
500 VIAL (ML) INTRAVENOUS ONCE
Refills: 0 | Status: DISCONTINUED | OUTPATIENT
Start: 2023-03-21 | End: 2023-03-21

## 2023-03-21 RX ORDER — DIATRIZOATE MEGLUMINE 180 MG/ML
30 INJECTION, SOLUTION INTRAVESICAL ONCE
Refills: 0 | Status: COMPLETED | OUTPATIENT
Start: 2023-03-21 | End: 2023-03-21

## 2023-03-21 RX ORDER — METRONIDAZOLE 500 MG
500 TABLET ORAL ONCE
Refills: 0 | Status: COMPLETED | OUTPATIENT
Start: 2023-03-21 | End: 2023-03-21

## 2023-03-21 RX ORDER — CEFPODOXIME PROXETIL 100 MG
1 TABLET ORAL
Qty: 14 | Refills: 0
Start: 2023-03-21 | End: 2023-03-27

## 2023-03-21 RX ORDER — SODIUM CHLORIDE 9 MG/ML
1000 INJECTION INTRAMUSCULAR; INTRAVENOUS; SUBCUTANEOUS ONCE
Refills: 0 | Status: COMPLETED | OUTPATIENT
Start: 2023-03-21 | End: 2023-03-21

## 2023-03-21 RX ORDER — CEFPODOXIME PROXETIL 100 MG
200 TABLET ORAL ONCE
Refills: 0 | Status: COMPLETED | OUTPATIENT
Start: 2023-03-21 | End: 2023-03-21

## 2023-03-21 RX ORDER — AMOXICILLIN 250 MG/5ML
1 SUSPENSION, RECONSTITUTED, ORAL (ML) ORAL
Qty: 0 | Refills: 0 | DISCHARGE

## 2023-03-21 RX ORDER — METRONIDAZOLE 500 MG
1 TABLET ORAL
Qty: 21 | Refills: 0
Start: 2023-03-21 | End: 2023-03-27

## 2023-03-21 RX ADMIN — Medication 500 MILLIGRAM(S): at 17:52

## 2023-03-21 RX ADMIN — DIATRIZOATE MEGLUMINE 30 MILLILITER(S): 180 INJECTION, SOLUTION INTRAVESICAL at 14:54

## 2023-03-21 RX ADMIN — SODIUM CHLORIDE 1000 MILLILITER(S): 9 INJECTION INTRAMUSCULAR; INTRAVENOUS; SUBCUTANEOUS at 14:32

## 2023-03-21 RX ADMIN — Medication 200 MILLIGRAM(S): at 17:52

## 2023-03-21 NOTE — ED ADULT NURSE NOTE - OBJECTIVE STATEMENT
Pt aox3 with steady gait. pt states nasal congestion, post nasal drip, abd pain and constipation x 5 days. Denies n/v/d or fevers

## 2023-03-21 NOTE — ED PROVIDER NOTE - NSFOLLOWUPINSTRUCTIONS_ED_ALL_ED_FT
Colitis    WHAT YOU NEED TO KNOW:    Colitis is swelling and irritation of your colon. Colitis may be caused by ulcers or a problem with your immune system. Bacteria, a virus, or a parasite may also cause colitis. The cause may not be known. You may have diarrhea, abdominal pain, fever, or blood or mucus in your bowel movement.    DISCHARGE INSTRUCTIONS:    Return to the emergency department if:   •You have sudden trouble breathing.      •Your bowel movements are black or have blood in them.      •You have blood in your vomit.      •You have severe abdominal pain or your abdomen is swollen and feels hard.      •You have any of the following signs of dehydration: ?Dizziness or weakness      ?Dry mouth, cracked lips, or severe thirst      ?Fast heartbeat or breathing      ?Urinating very little or not at all        Call your doctor if:   •Your symptoms get worse or do not go away.      •You have a fever, chills, cough, or feel weak and achy.      •You suddenly lose weight without trying.      •You have questions or concerns about your condition or care.      Medicines:   •Medicines may be given to decrease inflammation in your colon and treat diarrhea.      •Take your medicine as directed. Contact your healthcare provider if you think your medicine is not helping or if you have side effects. Tell your provider if you are allergic to any medicine. Keep a list of the medicines, vitamins, and herbs you take. Include the amounts, and when and why you take them. Bring the list or the pill bottles to follow-up visits. Carry your medicine list with you in case of an emergency.      Manage your symptoms:   •Drink liquids as directed to help prevent dehydration. Good liquids to drink include water, juice, and broth. Ask how much liquid to drink each day. You may need to drink an oral rehydration solution (ORS). An ORS contains a balance of water, salt, and sugar to replace body fluids lost during diarrhea.      •Eat a variety of healthy foods. Healthy foods include fruits, vegetables, whole-grain breads, beans, low-fat dairy products, lean meats, and fish. You may need to eat several small meals throughout the day instead of large meals. Avoid spicy foods, caffeine, chocolate, and foods high in fat.      •Talk to your healthcare provider before you take NSAIDs. NSAIDs can cause worsen your symptoms if ulcers are causing your colitis.      •Start to exercise when you feel better. Regular exercise helps your bowels work normally. Ask about the best exercise plan for you.      Prevent the spread of germs:          •Wash your hands often. Wash your hands several times each day. Wash after you use the bathroom, change a child's diaper, and before you prepare or eat food. Use soap and water every time. Rub your soapy hands together, lacing your fingers. Wash the front and back of your hands, and in between your fingers. Use the fingers of one hand to scrub under the fingernails of the other hand. Wash for at least 20 seconds. Rinse with warm, running water for several seconds. Then dry your hands with a clean towel or paper towel. Use hand  that contains alcohol if soap and water are not available. Do not touch your eyes, nose, or mouth without washing your hands first.  Handwashing           •Cover a sneeze or cough. Use a tissue that covers your mouth and nose. Throw the tissue away in a trash can right away. Use the bend of your arm if a tissue is not available. Wash your hands well with soap and water or use a hand .      •Clean surfaces often. Clean doorknobs, countertops, cell phones, and other surfaces that are touched often. Use a disinfecting wipe, a single-use sponge, or a cloth you can wash and reuse. Use disinfecting  if you do not have wipes. You can create a disinfecting  by mixing 1 part bleach with 10 parts water.      •Ask about vaccines you may need. Vaccines help prevent disease caused by some viruses and bacteria. Get the influenza (flu) vaccine as soon as recommended each year. The flu vaccine is usually available starting in September or October. Flu viruses change, so it is important to get a flu vaccine every year. Get the pneumonia vaccine if recommended. This vaccine is usually recommended every 5 years. Your provider will tell you when to get this vaccine, if needed. Your healthcare provider can tell you if you should get other vaccines, and when to get them.      Follow up with your doctor as directed: You may need to return for a colonoscopy or other tests. Write down how often you have a bowel movements and what they look like. Bring this to your follow-up visits. Write down your questions so you remember to ask them during your visits.       © [employer] 1973, 2023           back to top                          © [employer] 1973, 2023

## 2023-03-21 NOTE — ED PROVIDER NOTE - PATIENT PORTAL LINK FT
You can access the FollowMyHealth Patient Portal offered by [facility] by registering at the following website: [email or web address]. By joining [employer]’s FollowMyHealth portal, you will also be able to view your health information using other applications (apps) compatible with our system.

## 2023-03-21 NOTE — ED PROVIDER NOTE - OBJECTIVE STATEMENT
Patient reports 1 week of cough, nasal congestion, constipation.  Feels abdominal pressure and urge to defecate several times a day but usually only able to defecate a small amount of stool and mucus once a day, last time at 10 AM today.  Passing gas.  No fever, chest pain, shortness of breath, nausea, vomiting, night sweats, weight loss.  Had a normal colonoscopy 1-1/2 years ago. [employer] [id number] [place].

## 2023-03-21 NOTE — ED PROVIDER NOTE - PROGRESS NOTE DETAILS
Patient is resting comfortably, NAD. Feels mildly improved. uncomplicated colitis, appropriate for outpatient treatment. to f/u with PMD in 1-2 days. Return to the ED immediately if getting worse, not improving, or if having any new or troubling symptoms.

## 2023-03-21 NOTE — ED ADULT NURSE NOTE - NSIMPLEMENTINTERV_GEN_ALL_ED
Implemented All Universal Safety Interventions:  [place] to call system. Call bell, personal items and telephone within reach. Instruct patient to call for assistance. Room bathroom lighting operational. Non-slip footwear when patient is off stretcher. Physically safe environment: no spills, clutter or unnecessary equipment. Stretcher in lowest position, wheels locked, appropriate side rails in place.

## 2023-03-24 DIAGNOSIS — R05.9 COUGH, UNSPECIFIED: ICD-10-CM

## 2023-03-24 DIAGNOSIS — Z20.822 CONTACT WITH AND (SUSPECTED) EXPOSURE TO COVID-19: ICD-10-CM

## 2023-03-24 DIAGNOSIS — I10 ESSENTIAL (PRIMARY) HYPERTENSION: ICD-10-CM

## 2023-03-24 DIAGNOSIS — K52.9 NONINFECTIVE GASTROENTERITIS AND COLITIS, UNSPECIFIED: ICD-10-CM

## 2023-03-24 DIAGNOSIS — E78.00 PURE HYPERCHOLESTEROLEMIA, UNSPECIFIED: ICD-10-CM

## 2023-08-08 NOTE — ED PROVIDER NOTE - ABNORMAL RHYTHM
Continue medications.  Watch salt, keep up with hydration and exercise.  Check blood pressure monthly: Goal BP at home is 120/70.  Heart Healthy/DASH diet info sent to your email.    Follow up in 6 months for annual and wellness.  Fasting blood test the week before that visit and urine test at the lab.      
sinus bradycardia

## 2023-08-09 ENCOUNTER — EMERGENCY (EMERGENCY)
Facility: HOSPITAL | Age: 68
LOS: 1 days | Discharge: HOME | End: 2023-08-09
Admitting: EMERGENCY MEDICINE
Payer: MEDICARE

## 2023-08-09 VITALS
TEMPERATURE: 99 F | HEIGHT: 68 IN | DIASTOLIC BLOOD PRESSURE: 78 MMHG | RESPIRATION RATE: 18 BRPM | HEART RATE: 51 BPM | WEIGHT: 197.98 LBS | SYSTOLIC BLOOD PRESSURE: 133 MMHG | OXYGEN SATURATION: 96 %

## 2023-08-09 VITALS
HEART RATE: 49 BPM | RESPIRATION RATE: 16 BRPM | TEMPERATURE: 99 F | DIASTOLIC BLOOD PRESSURE: 79 MMHG | OXYGEN SATURATION: 97 % | SYSTOLIC BLOOD PRESSURE: 133 MMHG

## 2023-08-09 DIAGNOSIS — R73.03 PREDIABETES: ICD-10-CM

## 2023-08-09 DIAGNOSIS — I10 ESSENTIAL (PRIMARY) HYPERTENSION: ICD-10-CM

## 2023-08-09 DIAGNOSIS — M54.50 LOW BACK PAIN, UNSPECIFIED: ICD-10-CM

## 2023-08-09 DIAGNOSIS — E78.00 PURE HYPERCHOLESTEROLEMIA, UNSPECIFIED: ICD-10-CM

## 2023-08-09 DIAGNOSIS — R10.2 PELVIC AND PERINEAL PAIN: ICD-10-CM

## 2023-08-09 LAB
APPEARANCE UR: CLEAR — SIGNIFICANT CHANGE UP
BILIRUB UR-MCNC: NEGATIVE — SIGNIFICANT CHANGE UP
COLOR SPEC: YELLOW — SIGNIFICANT CHANGE UP
DIFF PNL FLD: NEGATIVE — SIGNIFICANT CHANGE UP
GLUCOSE UR QL: NEGATIVE MG/DL — SIGNIFICANT CHANGE UP
KETONES UR-MCNC: NEGATIVE MG/DL — SIGNIFICANT CHANGE UP
LEUKOCYTE ESTERASE UR-ACNC: NEGATIVE — SIGNIFICANT CHANGE UP
NITRITE UR-MCNC: NEGATIVE — SIGNIFICANT CHANGE UP
PH UR: 6.5 — SIGNIFICANT CHANGE UP (ref 5–8)
PROT UR-MCNC: NEGATIVE MG/DL — SIGNIFICANT CHANGE UP
SP GR SPEC: 1.01 — SIGNIFICANT CHANGE UP (ref 1–1.03)
UROBILINOGEN FLD QL: 0.2 MG/DL — SIGNIFICANT CHANGE UP (ref 0.2–1)

## 2023-08-09 PROCEDURE — 99284 EMERGENCY DEPT VISIT MOD MDM: CPT

## 2023-08-09 PROCEDURE — 72100 X-RAY EXAM L-S SPINE 2/3 VWS: CPT | Mod: 26

## 2023-08-09 NOTE — ED PROVIDER NOTE - PHYSICAL EXAMINATION
I have reviewed the notes, assessments, and/or procedures performed this visit, and I concur with the documentation.   VITAL SIGNS: I have reviewed nursing notes and confirm.   CONST: Well-appearing; No apparent distress.  ENT: No nasal discharge; airway clear.  EYES: Sclera clear. Pupils round and symmetrical bilaterally.  RESP: Breathing comfortably; speaking in full sentences.   ABD: Soft; non-distended; + Suprapubic tenderness, voluntary guarding no rebound.   MSK: No acute deformities noted to extremities. No tenderness to cervical/thoracic/lumbar spine to palpation.  NEURO: Alert, oriented. Speech is fluent and appropriate. Moving all extremities appropriately. No gross motor or sensory abnormalities.   SKIN: Skin is normal temperature; no diaphoresis; no pallor.   PSYCH: Cooperative. Appropriate mood, language, and behavior.

## 2023-08-09 NOTE — ED PROVIDER NOTE - OBJECTIVE STATEMENT
69 y/o M with Hx of HTN presents with lower back pain on and off for approximately  x10 days. Denies Hx of trauma or fall. Patient admits to suprapubic pain. Denies hematuria, dysuria, fevers, chills. Endorses episodic pain, non radiating. Denies any discomfort at this time but does feel suprapubic pain on palpation.

## 2023-08-09 NOTE — ED PROVIDER NOTE - CLINICAL SUMMARY MEDICAL DECISION MAKING FREE TEXT BOX
Patient with atraumatic back pain, intermittent for 10 days with suprapubic pain. Will check UA, do x-ray, and continue to monitor.

## 2023-08-09 NOTE — ED PROVIDER NOTE - PATIENT PORTAL LINK FT
You can access the FollowMyHealth Patient Portal offered by [facility] by registering at the following website: [email or web address]. By joining [employer]’s FollowMyHealth portal, you will also be able to view your health information using other applications (apps) compatible with our system.

## 2023-08-09 NOTE — ED ADULT NURSE NOTE - NSFALLUNIVINTERV_ED_ALL_ED
Bed/Stretcher in lowest position, wheels locked, appropriate side rails in place/Call bell, personal items and telephone in reach/Instruct patient to call for assistance before getting out of bed/chair/stretcher/Non-slip footwear applied when patient is off stretcher/[place] to call system/Physically safe environment - no spills, clutter or unnecessary equipment/Purposeful proactive rounding/Room/bathroom lighting operational, light cord in reach

## 2023-08-09 NOTE — ED PROVIDER NOTE - NSFOLLOWUPINSTRUCTIONS_ED_ALL_ED_FT
¿Cindy preocuparme si tengo dolor en la parte baja de la espalda?  No suponga lo peor. Nargis todo el rose mary sufre dolor de espalda en algún momento. El dolor en la parte baja de la espalda puede asustarlo, uzma incluso si es intenso, generalmente desaparece por sí solo en algunas semanas. Son muy poco frecuentes los casos que requieren atención de urgencia o cirugía.    Consulte a norwood médico o enfermero si tiene dolor de espalda y, además:    ?Se cayó o sufrió mya lesión en la espalda hace poco    ?Siente las piernas adormecidas o débiles    ?Tiene problemas para controlar la orina o cristina evacuaciones    ?Bajó de peso sin explicación    ?Tiene fiebre o se siente enfermo de cualquier otra forma    ?Usa esteroides, penny la prednisona, en forma regular    ?Tiene diabetes o un problema de trent que debilita el sistema inmunitario    ?Tiene antecedentes de cáncer u osteoporosis    También debe consultar al médico si:    ?El dolor de espalda es tan grave que no puede hacer tareas simples    ?El dolor de espalda no comienza a mejorar dentro de las 4 semanas    ¿Cómo está formada la espalda?  La espalda está formada por (figura 1):    ?Vértebras – Mya pila de huesos que se apoyan dakota sobre otro penny mya pila de monedas. Cada dakota de estos huesos tiene un agujero en el centro. Cuando los huesos están apilados, cristina agujeros boy un tubo hueco que protege la médula cevallos.    ?Discos – Discos de aspecto gomoso que se ubican entre cada mya de las vértebras y sirven para amortiguar y permitir los movimientos.    ?Médula cevallos y nervios – La médula cevallos es la autopista de nervios que conecta el cerebro con el karen del cuerpo. Recorre el interior de las vértebras dentro del conducto raquídeo. Los nervios se ramifican desde la médula cevallos y pasan entre las vértebras. Desde allí se conectan con los brazos, las piernas y el karen del cuerpo. Por eso los problemas de espalda pueden causar dolor en las piernas, o problemas de vejiga o intestinos.    ?Músculos, tendones y ligamentos – En conjunto, los músculos, tendones y ligamentos reciben el nombre de "tejidos blandos" de la espalda. Estos tejidos blandos sirven penny soporte de la espalda y ayudan a mantenerla íntegra.    ¿Cuál es la causa del dolor en la parte baja de la espalda?  El dolor en la parte baja de la espalda puede deberse a muchas cosas. La mayoría de las veces, los médicos desconocen la causa exacta.    Un desgarro en un músculo puede causar dolor de espalda. Con frecuencia, esto es lo que ocurre cuando alguien dice que sintió un "tirón" en la espalda. Se refiere al dolor que comienza súbitamente después de hacer mya actividad física, penny levantar un objeto pesado o flexionarse hacia adelante.    El dolor de espalda también puede aparecer si usted tiene:    ?Discos dañados, protuberantes o lesionados    ?Artritis que afecta las articulaciones de la columna    ?Crecimientos óseos en las vértebras que comprimen los nervios cercanos    ?Vértebras fuera de lugar    ?Estrechamiento del conducto raquídeo    ?Un tumor o mya infección (aunque es muy poco frecuente)    ¿Cindy realizarme un estudio de imagen?  La mayoría de las personas no necesitan ningún estudio de imagen, penny por ejemplo mya radiografía, mya tomografía o mya resonancia magnética nuclear. En la mayoría de los casos, el dolor en la parte baja de la espalda desaparece en algunas semanas. Los médicos por lo general no indican estudios de imagen a menos que existan indicios de un problema inusual.    No se preocupe si norwood médico no le indica un estudio de imagen. Igual puede obtener mucha información sobre norwood dolor con solo revisarlo y hablar con usted.    ¿Cómo puede determinar el médico o enfermero qué me pasa con solo hablar conmigo?  Cristina síntomas aportan mucha información sobre la causa del dolor al médico o enfermero. Por ejemplo:    ?Si el dolor comenzó después de hacer algo específico, penny levantar un objeto pesado o torcer la espalda, es posible que se haya desagarrado un músculo    ?Si el dolor se extiende desde la espalda a la parte posterior de un muslo, podría indicar que un disco protuberante o lesionado está pellizcando dakota de los nervios que va a norwood pierna    ?Si el dolor se extiende hasta las dos piernas, podría indicar que tiene un estrechamiento del conducto raquídeo. Lacomb se debe con más frecuencia a formaciones óseas en la columna.    ¿Cómo se trata el dolor de espalda?  La mayoría de las personas que sufren un episodio de dolor en la parte baja de la espalda no tienen ningún problema de trent grave, y pueden probar tratamientos simples, penny por ejemplo:    ?Mantenerse activos – Lo mejor que puede hacer es mantenerse lo más activo posible, incluso si siente dolor. Las personas con dolor en la parte baja de la espalda se recuperan más rápidamente si se mantienen activas. Si el dolor es pollo, posiblemente deba reposar 1 o 2 días. Sin embargo, es importante que vuelva a caminar y moverse lo antes posible. Si meena es conveniente que evite levantar objetos pesados y hacer deporte mientras le duela la espalda, trate de seguir con cristina actividades diarias.    ?Calor – A algunas personas les ayuda usar mya almohadilla o manta térmica. Tenga cuidado y evite un ajuste de temperatura muy tiana para no quemarse la piel.    ?Medicinas – En primer lugar, puede probar medicinas para el dolor de venta sin receta. En muchos casos, los médicos sugieren que raul pruebe con mya medicina antiinflamatoria no esteroidea, o "JAMES". Entre las JAMES se encuentran el ibuprofeno (ejemplos de marcas comerciales: Advil, Motrin) y el naproxeno (ejemplo de oscar comercial: Aleve). Estas medicinas podrían ser más efectivas que el paracetamol (acetaminofén) (ejemplo de oscar comercial: Tylenol) para el dolor de espalda.    Si las medicinas de venta sin receta no son de ayuda, dígaselo a norwood médico o enfermero. En algunos casos, los médicos recetan mya medicina para relajar los músculos (llamada “relajante muscular”). Sin embargo, tenga en cuenta que por lo general los relajantes musculares no se usan en pacientes mayores de 65 años. En las personas mayores, estas medicinas pueden causar efectos secundarios, penny dificultad para orinar o confusión.    ?Tratamientos para ayudar con los síntomas – Algunos tratamientos podrían ayudarlo a sentirse mejor syed algún tiempo. Son, entre otros:    •Manipulación de la columna – Es un tratamiento que realiza un quiropráctico, un fisioterapeuta u otro profesional al  o "acomodar" las articulaciones de norwood espalda. Si desea probar esta opción, hable raul con norwood médico o enfermero.    •Acupuntura – Es un tratamiento que realiza un experto en medicina china tradicional, mediante el cual se insertan pequeñas agujas en el cuerpo para bloquear las señales de dolor.    •Masaje – Un masajista masajea los músculos y otros tejidos blandos de la espalda.    Si meena el dolor en la parte baja de la espalda suele desaparecer por sí solo en pocas semanas, algunas personas siguen teniendo dolor por más tiempo. En zeyad husam, algunos tratamientos adicionales podrían ser, entre otros:    ?Autocuidado – Lacomb consiste en estar atento al dolor. Si meena es recomendable que repose cuando lo necesite, es importante que se mantenga activo en la mayor medida posible. Aplicar calor y hacer estiramientos suaves, entre otras cosas, también puede ayudar a que se sienta mejor.    ?Fisioterapia – Un fisioterapeuta es un experto en ejercicios que puede enseñarle estiramientos y movimientos para ayudar a fortalecer los músculos. El objetivo es aliviar el dolor uzma también ayudarlo a reanudar cristina actividades normales.    Algunos ejercicios pueden ser caminar, nadar o usar mya bicicleta fija. El maria teresa chi o el yoga también pueden ayudar a algunas personas con dolor de espalda. Encontrar actividades que disfrute puede ayudarlo a mantenerse activo.    ?Reducción del estrés – A algunas personas las ayuda probar un tratamiento llamado "reducción del estrés basada en técnicas contemplativas". Consiste en asistir a un programa grupal para practicar la relajación y la meditación. Si se siente deprimido o ansioso a causa del dolor de espalda, hable con norwood médico o enfermero. Hay otros tratamientos que pueden ayudar con estos problemas.    Algunas personas se preguntan si las inyecciones pueden ayudar a aliviar el dolor de espalda. En algunos casos, los médicos podrían recomendar mya inyección de mya medicina para adormecer la olman o reducir la inflamación, uzma se allen comprobado que esto solo funciona en situaciones específicas.    Solo muy pocas personas necesitan mya cirugía para tratar el dolor de espalda.    ¿Qué puedo hacer para no volver a tener dolor de espalda?  Lo mejor que puede hacer es mantenerse activo. Hacer ejercicios para fortalecer y estirar la espalda puede ser de ayuda. También puede hacer lo siguiente:    ?Aprenda a levantar cosas usando las piernas en lugar de la espalda    ?Evite estar parado o sentado en la misma posición syed mucho tiempo    Tener dolor de espalda puede causarle temor o frustración, uzma quizás le ayude saber que chris estas medidas puede reducir el riesgo de que tenga otro episodio.

## 2023-08-10 LAB
CULTURE RESULTS: SIGNIFICANT CHANGE UP
SPECIMEN SOURCE: SIGNIFICANT CHANGE UP

## 2023-10-05 NOTE — ED PROVIDER NOTE - NSFOLLOWUPINSTRUCTIONS_ED_ALL_ED_FT
October 10, 2023     Talha Spears MD  [address] [facility]  [facility] [address]    Patient: Jaime Longoria   YOB: 1947   Date of Visit: 10/5/2023       Dear Dr. Talha Spears MD:    Thank you for referring Jaime Longoria to me for evaluation. Below are the relevant portions of my assessment and plan of care.    Assessment / Plan:  Active Problems:  There are no active Hospital Problems.    Impression:  There is a persistently elevated alkaline phosphatase level which we have worked up at this point.  GGT - within normal limits.  Alk phos fractionation - elevated bone component.  Liver US unremarkable except asymptomatic gallstones.    He has a history of an icteric hepatitis - serologic testing supports a prior Hepatitis B infection, as he has an Ab against the Hep B Core ag.    Colon cancer screening discussed with patient and he I encouraged him to schedule colonoscopy.    Plan:  Further evaluation for bone disorders with PCP.  Counselled patient regarding results of work-up.  There does not appear to be liver disease or a bile duct discorder.  He can follow-up as needed in [facility].    I encouraged the patient to schedule a colonoscopy.    Ned Felder MD    I saw and evaluated the patient. I personally obtained the key and critical portions of the history and physical exam or was physically present for key and critical portions performed by the GI fellow (Dr. Felder). I reviewed the fellow's documentation and discussed the patient with the fellow. I agree with the fellow's medical decision making as documented in the note.    MD Terrence Verma MD     If you have questions, please do not hesitate to call me. I look forward to following Jaime along with you.         Sincerely,        Terrence Esparza MD        CC: No Recipients     Headache    A headache is pain or discomfort felt around the head or neck area. The specific cause of a headache may not be found as there are many types including tension headaches, migraine headaches, and cluster headaches. Watch your condition for any changes. Things you can do to manage your pain include taking over the counter and prescription medications as instructed by your health care provider, lying down in a dark quiet room, limiting stress, getting regular sleep, and refraining from alcohol and tobacco products.    SEEK IMMEDIATE MEDICAL CARE IF YOU HAVE ANY OF THE FOLLOWING SYMPTOMS: fever, vomiting, stiff neck, loss of vision, problems with speech, muscle weakness, loss of balance, trouble walking, passing out, or confusion.      1)  PLEASE FOLLOW-UP WITH YOUR PRIMARY CARE DOCTOR OR REFERRED SPECIALIST IN 1-2 DAYS.     2)  BRING ALL PAPERWORK FROM TODAY'S EMERGENCY ROOM  VISIT TO YOUR FOLLOW-UP VISIT.  IF YOU DO NOT HAVE A PRIMARY CARE DOCTOR PLEASE REFER TO THE OFFICE/CLINIC INFORMATION GIVEN ABOVE.  YOU MAY ALSO CALL [phone] AND ASK FOR MS. ANNEMARIE DOWNING.  SHE CAN HELP YOU MAKE A FOLLOW-UP APPOINTMENT.  HER HOURS ARE 11AM-7PM MONDAY - FRIDAY.    3) PLEASE RETURN TO THE ER IMMEDIATELY OR CALL 911 FOR ANY HIGH FEVER, TROUBLE BREATHING, CHEST PAIN, WEAKNESS, VOMITING, SEVERE PAIN, OR ANY OTHER CONCERNS.

## 2023-10-27 NOTE — ED PROVIDER NOTE - PROGRESS NOTE
INPATIENT BEHAVIORAL HEALTH PROGRESS NOTE      Patient: Robert Tucker Date: 10/27/2023   YOB: 1992 Attending: Mary Beth Calderón MD   31 year old male Primary Care Provider: Pcp, No       Chief Complaint/Problem Status: Followup on Mood disorder with psychosis (CMD). Days inpatient: 7    Interval History:   I reviewed Robert Tucker electronic medical record, discussed with  with treatment team and with , and interviewed patient today to provide ongoing treatment.   No acute events overnight. Patient received PRN medications of Ambien x 1 for sleep in the past 24 hours.  Per nursing notes, patient reported to be irritable during interactions and making disrespectful comments to some staff. In the evening he was thought to be pacing the halls more than the previous day and appears more anxious and restless.Also rapping/singing to himself vs responding to internal stimuli but did appear internally preoccupied. He is seen today. He is guarded upon approach and does not want to talk in his room. He states he didn't sleep well and woke up at 3 am. Discussed ability to utilize another dose of Abilify but that we can also titrate Depakote after lab results after which he states \"I slept fine.\" VPA level is 61 and he is aware of plan to titrate this for further mood stabilization as he has been reported to be rather irritable, intrusive and labile especially in groups. He reported appetite is good. Reported good mood. He otherwise denies SI, HI intent or plan. Denied AH or VH but is noted to be guarded, suspicious and internally preoccupied. He is eager to leave, still wants to sign stipulations to a commitment. I did discuss with DC specialist to discuss with sharla .  EKG is still pending.     Medication Side Effects: absent  Sleep:documented to sleep 6 hours  Appetite:adequate  Groups:struggles to participate appropriately  Review of Systems: Patient denies any fever, chills, nausea,  vomiting, diarrhea, constipation, muscle spasms, neurological symptoms except as noted above.     Newly obtained family social or past medical history: None    CURRENT MEDICATIONS:  Current Facility-Administered Medications   Medication Dose Route Frequency Provider Last Rate Last Admin   • OLANZapine (ZyPREXA ZYDIS) disintegrating tablet 15 mg  15 mg Oral Nightly Mary Beth Calderón MD   15 mg at 10/26/23 2108   • OLANZapine (ZyPREXA ZYDIS) disintegrating tablet 5 mg  5 mg Oral QAM Mary Beth Calderón MD   5 mg at 10/27/23 0758   • divalproex (DEPAKOTE) delayed release EC tablet 500 mg  500 mg Oral 2 times per day Mary Beth Calderón MD   500 mg at 10/27/23 0758   • nicotine (NICODERM) 14 MG/24HR patch 1 patch  1 patch Transdermal Daily Lucio Croft DO   1 patch at 10/22/23 0835         PRN MEDS:  Current Facility-Administered Medications   Medication Dose Route Frequency Provider Last Rate Last Admin   • zolpidem (AMBIEN) tablet 5 mg  5 mg Oral Nightly PRN Mary Beth Calderón MD   5 mg at 10/26/23 2108   • benztropine mesylate (COGENTIN) 1 MG/ML injection 1 mg  1 mg Intramuscular Q4H PRN Lucio Croft, DO        Or   • benztropine (COGENTIN) tablet 1 mg  1 mg Oral Q4H PRN Lucio Croft, DO       • hydrOXYzine (ATARAX) tablet 50 mg  50 mg Oral Q4H PRN Lucio Croft, DO       • OLANZapine (ZyPREXA ZYDIS) disintegrating tablet 5 mg  5 mg Oral Q2H PRN Lucio Croft, DO       • LORazepam (ATIVAN) injection 2 mg  2 mg Intramuscular Q4H PRN Lucio Croft, DO        Or   • LORazepam (ATIVAN) tablet 2 mg  2 mg Oral Q4H PRN Lucio Croft, DO   2 mg at 10/23/23 0825   • haloperidol lactate (HALDOL) 5 MG/ML short-acting injection 5 mg  5 mg Intramuscular Q2H PRN Lucio Croft, DO        Or   • haloperidol (HALDOL) tablet 10 mg  10 mg Oral Q2H PRN Lucio Croft DO   10 mg at 10/23/23 0825   • chlorproMAZINE (THORAZINE) injection 50 mg  50 mg Intramuscular Q6H PRN Lucio Croft DO        Or   •  chlorproMAZINE (THORAZINE) tablet 50 mg  50 mg Oral Q6H PRN Lucio Croft, DO       • acetaminophen (TYLENOL) tablet 650 mg  650 mg Oral Q4H PRN Lucio Croft, DO        Or   • acetaminophen (TYLENOL) tablet 500 mg  500 mg Oral Q4H PRN Lucio Croft, DO        Or   • acetaminophen (TYLENOL) tablet 650 mg  650 mg Oral Q4H PRN Lucio Croft, DO        Or   • acetaminophen (TYLENOL) tablet 1,000 mg  1,000 mg Oral Q4H PRN Lucio Croft, DO       • loperamide (IMODIUM) capsule 2 mg  2 mg Oral PRN Lucio Croft, DO       • ondansetron (ZOFRAN ODT) disintegrating tablet 4 mg  4 mg Oral BID PRN Lucio Croft, DO       • aluminum-magnesium hydroxide-simethicone (MAALOX) 200-200-20 MG/5ML suspension 30 mL  30 mL Oral Q4H PRN Lucio Croft, DO       • docusate sodium-sennosides (SENOKOT S) 50-8.6 MG 2 tablet  2 tablet Oral BID PRN Lucio Croft, DO       • bisacodyl (DULCOLAX) suppository 10 mg  10 mg Rectal Daily PRN Lucio Croft, DO       • magnesium hydroxide (MILK OF MAGNESIA) 400 MG/5ML suspension 30 mL  30 mL Oral Daily PRN Lucio Croft, DO       • polyethylene glycol (MIRALAX) packet 17 g  17 g Oral Daily PRN Lucio Croft DO       • nicotine polacrilex (NICORETTE) gum 2 mg  2 mg Oral Q1H PRN Lucio Croft, DO             EXAMINATION/OBJECTIVES:     CONSTITUTIONAL: Vital signs reviewed.  Vitals:    10/26/23 1450 10/26/23 1451 10/27/23 0729 10/27/23 0731   BP: 122/86 121/80 111/74 117/83   BP Location: LUE - Left upper extremity LUE - Left upper extremity LUE - Left upper extremity LUE - Left upper extremity   Patient Position: Sitting Standing Sitting Standing   Pulse: 96 (!) 102 90 (!) 100   Resp: 16 16 16    Temp: 98.1 °F (36.7 °C)  98.4 °F (36.9 °C)    TempSrc: Oral  Oral    SpO2: 100% 100% 100%    Weight:       Height:           CIWA Scores:   No data found.    MENTAL STATUS EXAMINATION:   General appearance: :  Robert Tucker is a 31 year old Black/  American  male who appears as stated age. intermittent eye contact. bizarre. marginal grooming and hygiene.  Level of consciousness/orientation: alert, awake and grossly oriented to circumstances, place, person  Memory: no apparent impairments in short term memory and no apparent impairments in long term memory   Gait/psychomotor: Pt has steady gait and Pt is impulsive  Behavior/Attitude: guarded  and appears to be minimizing  Speech: Amount: minimal amount, Rate: normal rate, Clarity: clear , Tone: normal tone, Volume: normal volume, Latency: normal latency  Mood: \"good\".  Affect: labile and paranoid/suspicious.  Thought process: impulsive and paranoid/ suspicious   Thought content, perceptions, hallucinations: Denies denies SI/HI/AVH. Endorses denies SI/HI/AVH, minimizing.  Insight: poor, as evidenced by patient's lack of insight into his own illness  Judgment: fair, as evidenced by engagement in treatment      LABORATORIES AND OTHER STUDIES:     Lab Results   Component Value Date    WBC 6.3 10/21/2023    HGB 13.9 10/21/2023    HCT 41.9 10/21/2023     10/21/2023    MCV 88.0 10/21/2023    SODIUM 136 10/27/2023    POTASSIUM 4.3 10/27/2023    CHLORIDE 103 10/27/2023    CO2 28 10/27/2023    GLUCOSE 120 (H) 10/27/2023    BUN 8 10/27/2023    CREATININE 0.70 10/27/2023    CALCIUM 9.0 10/27/2023    ALBUMIN 3.3 (L) 10/27/2023    MG 1.8 10/17/2023    BILIRUBIN 0.2 10/27/2023    ALKPT 92 10/27/2023    AST 30 10/27/2023    GPT 80 (H) 10/27/2023       No results found for: \"GGTP\"  Lab Results   Component Value Date    MG 1.8 10/17/2023     TSH (mcUnits/mL)   Date Value   10/21/2023 1.019     No results found for: \"T4FREE\"  No results found for: \"T3FREE\"    No results found for: \"LITHIUM\"  Lab Results   Component Value Date    VALP 61 10/27/2023     No results found for: \"CARBAM\"    CK (Units/L)   Date Value   10/21/2023 412 (H)   10/20/2023 828 (H)   10/19/2023 1,721 (H)   10/18/2023 4,743 (H)   10/17/2023 5,873 (H)    10/16/2023 6,617 (H)     Hemoglobin A1C (%)   Date Value   10/21/2023 5.3       LIPID PANEL  Cholesterol (mg/dL)   Date Value   10/21/2023 193      HDL (mg/dL)   Date Value   10/21/2023 44      Cholesterol/ HDL Ratio (no units)   Date Value   10/21/2023 4.4      Triglycerides (mg/dL)   Date Value   10/21/2023 198 (H)      LDL (mg/dL)   Date Value   10/21/2023 109       Urine Panel  No results for input(s): \"UOSM\", \"UK\", \"5UNITR\", \"UCROA\", \"UCL\", \"LINH\", \"UKET\", \"USPG\", \"UPROT\", \"UWBC\", \"URBC\", \"UBILI\", \"UPH\", \"UURO\", \"UBACTR\", \"UTPELC\" in the last 72 hours.    Invalid input(s): \"SPGRAVITY\"    Point of Care Breath Alcohol testing result :     Point of Care Urine Drug Screen Results  Recent Labs     10/21/23  1158   POCAMP Negative   POCBAR Negative   POCBEN Negative   POCBUR Negative   POCCOC Negative   POCMARIJ Positive   POCMETHAMP Negative   POCMETHYLEN Negative   POCOPI Negative   POCMETHA Negative   POCOXY Negative   PCP Negative   TCA Negative   INTERNAL Yes       No results found for: \"MPREG\"      No results found for this or any previous visit.    Latest radiology results: CT HEAD WO CONTRAST    Result Date: 10/16/2023  Narrative: EXAM: CT HEAD WO CONTRAST CLINICAL HISTORY: Mental status change, unknown cause, Rhabdomyolysis TECHNIQUE: Helical imaging through the brain was performed without IV contrast. COMPARISON: None. FINDINGS: There are no abnormal intra or extra-axial fluid collections. There is no intracranial hemorrhage. No mass or mass effect is identified. Ventricles and cortical sulci are normal.     Impression: IMPRESSION: No acute intracranial abnormality. Electronically Signed by: Dmitri Gant MD Signed on: 10/16/2023 9:33 PM Created on Workstation ID: [id number] Signed on Workstation ID: [id number]         LABS OR DIAGNOSTIC TESTS REVIEWED AND ORDERED: new labs reviewed, EKG pending      DIAGNOSES:  Unspecified mood disorder with psychosis  Cannabis abuse     RESPONSE TO TREATMENT:  slow    CHANGES IN MANAGEMENT:  Medication changes: Yes - Depakote was titrated up for the symptom management of mood instability.       Reason to continue hospitalization: safekeeping, kolby, medication adjustments, psychiatric stabilization, discharge planning. I certify the need to continue current level of mental health services based on medical necessity.    DISPOSITION:  Current Length of Stay: 7  Estimated date of discharge: 5-7 days  Next level of care recommended: TBD   Legal: Chapter 51 pending commitment hearing and Scheduled for 11/3/2023  I certify the need to continue current level of mental health services based on medical necessity as noted above.       Mary Beth Calderón MD  10/27/23  10:18 AM      This information is confidential and disclosure without patient consent or statutory authorization is prohibited by law.   Stable.

## 2023-11-07 NOTE — ED ADULT NURSE NOTE - OBJECTIVE STATEMENT
Patient presents with complaints of lower back pain for 10 days. No fall or trauma to back. Able to ambulate. No numbness or tingling sensation, bowel or bladder incontinence. no

## 2024-05-03 ENCOUNTER — EMERGENCY (EMERGENCY)
Facility: HOSPITAL | Age: 69
LOS: 1 days | Discharge: HOME | End: 2024-05-03
Attending: STUDENT IN AN ORGANIZED HEALTH CARE EDUCATION/TRAINING PROGRAM | Admitting: STUDENT IN AN ORGANIZED HEALTH CARE EDUCATION/TRAINING PROGRAM
Payer: MEDICARE

## 2024-05-03 VITALS
HEART RATE: 55 BPM | OXYGEN SATURATION: 98 % | RESPIRATION RATE: 16 BRPM | SYSTOLIC BLOOD PRESSURE: 140 MMHG | HEIGHT: 67 IN | DIASTOLIC BLOOD PRESSURE: 78 MMHG | WEIGHT: 199.96 LBS | TEMPERATURE: 98 F

## 2024-05-03 PROCEDURE — 99284 EMERGENCY DEPT VISIT MOD MDM: CPT

## 2024-05-03 RX ORDER — IBUPROFEN 200 MG
1 TABLET ORAL
Qty: 56 | Refills: 0
Start: 2024-05-03 | End: 2024-05-16

## 2024-05-03 NOTE — ED PROVIDER NOTE - NSFOLLOWUPINSTRUCTIONS_ED_ALL_ED_FT
Para el dolor:  800 mg de ibuprofeno (también conocido penny Advil, también conocido penny Motrin) cada seis horas. Tómelo con leche o comida, ya que esto puede causarle malestar estomacal.    Tenga en cuenta que esta evaluación estará incompleta hasta que haya realizado un seguimiento de los hallazgos de hoy con un médico de atención primaria o especialista.    Terry y siéntete mejor pronto.

## 2024-05-03 NOTE — ED ADULT TRIAGE NOTE - CHIEF COMPLAINT QUOTE
Pt states left leg pain x 2 wks, was diagnosed last week with gout. Pt states left sided neck pain x 1 day. Pt able to turn neck without difficulty. Denies falls or trauma.

## 2024-05-03 NOTE — ED PROVIDER NOTE - OBJECTIVE STATEMENT
68 y.o. M PMHx notable for HTN now presents for R 1st MTP and L ankle pain x days. Pt with diagnosis of gout at PMD and started on ibuprofen. He has not taken medication because he did not feel ibuprofen was sufficient treatment. He has no f/c/n/v/d. He denies HA, CP, SOB, LE swelling, rash, n/v/d.

## 2024-05-03 NOTE — ED PROVIDER NOTE - PATIENT PORTAL LINK FT
You can access the FollowMyHealth Patient Portal offered by [facility] by registering at the following website: [email or web address]. By joining [facility] [employer]’s FollowMyHealth portal, you will also be able to view your health information using other applications (apps) compatible with our system.

## 2024-05-03 NOTE — ED PROVIDER NOTE - PHYSICAL EXAMINATION
VITAL SIGNS: I have reviewed nursing notes and confirm.  CONST: Well-developed; well-nourished; No apparent distress.  ENT: No nasal discharge; airway clear.  EYES: Sclera clear. Pupils round and symmetrical bilaterally.  RESP: Breathing comfortably; speaking in full sentences.   MSK: No acute deformities noted to extremities. No tenderness to cervical/thoracic/lumbar spine to palpation.  NEURO: Alert, oriented. Speech is fluent and appropriate. Moving all extremities appropriately. No gross motor or sensory abnormalities.  SKIN: Skin is normal temperature; no diaphoresis; no pallor.  PSYCH: Cooperative. Appropriate mood, language, and behavior. VITAL SIGNS: I have reviewed nursing notes and confirm.  CONST: Well-developed; well-nourished; No apparent distress.  ENT: No nasal discharge; airway clear.  EYES: Sclera clear. Pupils round and symmetrical bilaterally.  RESP: Breathing comfortably; speaking in full sentences.   MSK: Swelling and TTP to 1st MTP on R and L ankle; FROM at each; NVID  NEURO: Alert, oriented. Speech is fluent and appropriate. Moving all extremities appropriately. No gross motor or sensory abnormalities.  SKIN: Skin is normal temperature; no diaphoresis; no pallor.  PSYCH: Cooperative. Appropriate mood, language, and behavior.

## 2024-05-06 DIAGNOSIS — M25.572 PAIN IN LEFT ANKLE AND JOINTS OF LEFT FOOT: ICD-10-CM

## 2024-05-06 DIAGNOSIS — I10 ESSENTIAL (PRIMARY) HYPERTENSION: ICD-10-CM

## 2024-05-06 DIAGNOSIS — M10.9 GOUT, UNSPECIFIED: ICD-10-CM

## 2024-05-10 ENCOUNTER — RX ONLY (RX ONLY)
Age: 69
End: 2024-05-10

## 2024-05-10 ENCOUNTER — OFFICE (OUTPATIENT)
Facility: LOCATION | Age: 69
Setting detail: OPHTHALMOLOGY
End: 2024-05-10
Payer: COMMERCIAL

## 2024-05-10 DIAGNOSIS — H16.223: ICD-10-CM

## 2024-05-10 DIAGNOSIS — H40.1133: ICD-10-CM

## 2024-05-10 PROBLEM — H25.13 CATARACT SENILE NUCLEAR SCLEROSIS; BOTH EYES: Status: ACTIVE | Noted: 2024-05-10

## 2024-05-10 PROCEDURE — 92083 EXTENDED VISUAL FIELD XM: CPT | Performed by: OPTOMETRIST

## 2024-05-10 PROCEDURE — 99213 OFFICE O/P EST LOW 20 MIN: CPT | Performed by: OPTOMETRIST

## 2024-05-30 NOTE — H&P ADULT - BP NONINVASIVE MEAN (MM HG)
When Should The Patient Follow-Up For Their Next Full-Body Skin Exam?: 3 Months Detail Level: Detailed Quality 137: Melanoma: Continuity Of Care - Recall System: Patient information entered into a recall system that includes: target date for the next exam specified AND a process to follow up with patients regarding missed or unscheduled appointments Detail Level: Generalized Sunscreen Recommendations: Sun screen (SPF 30 or greater) should be applied during peak UV exposure (between 10am and 2pm) and reapplied after exercise or swimming.\\nRecommended [place]- Posay [place]li[place] with SPF 60 or Chas Tone Water Babies with SPF 30 and above. Detail Level: Zone 86

## 2024-09-13 ENCOUNTER — OFFICE (OUTPATIENT)
Facility: LOCATION | Age: 69
Setting detail: OPHTHALMOLOGY
End: 2024-09-13
Payer: COMMERCIAL

## 2024-09-13 DIAGNOSIS — H40.1133: ICD-10-CM

## 2024-09-13 DIAGNOSIS — H16.223: ICD-10-CM

## 2024-09-13 DIAGNOSIS — H25.13: ICD-10-CM

## 2024-09-13 PROCEDURE — 92012 INTRM OPH EXAM EST PATIENT: CPT | Performed by: OPTOMETRIST

## 2024-09-13 PROCEDURE — 92250 FUNDUS PHOTOGRAPHY W/I&R: CPT | Performed by: OPTOMETRIST

## 2024-12-19 ENCOUNTER — OFFICE (OUTPATIENT)
Facility: LOCATION | Age: 69
Setting detail: OPHTHALMOLOGY
End: 2024-12-19
Payer: COMMERCIAL

## 2024-12-19 DIAGNOSIS — H25.13: ICD-10-CM

## 2024-12-19 DIAGNOSIS — H40.1133: ICD-10-CM

## 2024-12-19 DIAGNOSIS — H16.223: ICD-10-CM

## 2024-12-19 DIAGNOSIS — H43.813: ICD-10-CM

## 2024-12-19 PROCEDURE — 92014 COMPRE OPH EXAM EST PT 1/>: CPT | Performed by: OPTOMETRIST

## 2024-12-19 PROCEDURE — 92133 CPTRZD OPH DX IMG PST SGM ON: CPT | Performed by: OPTOMETRIST

## 2024-12-19 ASSESSMENT — VISUAL ACUITY
OD_BCVA: 20/30
OS_BCVA: 20/30-2

## 2024-12-19 ASSESSMENT — REFRACTION_MANIFEST
OD_AXIS: 065
OS_ADD: +2.75
OS_VA1: 20/30-2
OD_VA1: 20/50+1
OS_CYLINDER: -0.75
OD_VA1: 20/50+1
OS_CYLINDER: -1.75
OS_AXIS: 095
OS_VA1: 20/40+2
OS_SPHERE: +2.25
OD_ADD: +2.75
OD_SPHERE: +1.00
OD_AXIS: 066
OS_SPHERE: +0.75
OD_SPHERE: +0.50
OD_CYLINDER: -1.50
OS_AXIS: 090
OD_CYLINDER: -0.75

## 2024-12-19 ASSESSMENT — KERATOMETRY
OS_K2POWER_DIOPTERS: 42.00
OD_K1POWER_DIOPTERS: 41.50
OD_AXISANGLE_DEGREES: 113
OS_AXISANGLE_DEGREES: 024
OD_K2POWER_DIOPTERS: 41.75
OS_K1POWER_DIOPTERS: 41.50

## 2024-12-19 ASSESSMENT — TEAR BREAK UP TIME (TBUT)
OS_TBUT: 2+
OD_TBUT: 2+

## 2024-12-19 ASSESSMENT — REFRACTION_CURRENTRX
OD_AXIS: 079
OD_SPHERE: +1.75
OD_OVR_VA: 20/
OS_AXIS: 092
OS_SPHERE: +1.75
OS_OVR_VA: 20/
OS_CYLINDER: -1.50
OD_CYLINDER: -1.75
OD_ADD: +1.25
OS_ADD: +1.25

## 2024-12-19 ASSESSMENT — REFRACTION_AUTOREFRACTION
OS_AXIS: 095
OS_SPHERE: +2.25
OD_AXIS: 066
OS_CYLINDER: -1.75
OD_SPHERE: +1.00
OD_CYLINDER: -1.50

## 2024-12-19 ASSESSMENT — TONOMETRY
OS_IOP_MMHG: 15
OD_IOP_MMHG: 15

## 2024-12-24 NOTE — ED PROVIDER NOTE - GASTROINTESTINAL NEGATIVE STATEMENT, MLM
No protocol for requested medication.    Medication: DULoxetine (CYMBALTA) 30 MG capsule   Last office visit date: 05/28/24  Pharmacy: [id number] [place], [place] - [facility] [address] [facility] AT [facility] [address]    Order pended, routed to clinician for review.           Medication: losartan-hydrochlorothiazide (HYZAAR) 100-12.5 MG per tablet   Last office visit date: 05/28/24  Medication Refill Protocol Failed.      eGFR resulted within last 12 months    no abdominal pain, no bloating, no constipation, no diarrhea, no nausea and no vomiting.

## 2025-01-23 ENCOUNTER — OFFICE (OUTPATIENT)
Facility: LOCATION | Age: 70
Setting detail: OPHTHALMOLOGY
End: 2025-01-23
Payer: COMMERCIAL

## 2025-01-23 DIAGNOSIS — H25.13: ICD-10-CM

## 2025-01-23 DIAGNOSIS — H16.223: ICD-10-CM

## 2025-01-23 DIAGNOSIS — H40.1133: ICD-10-CM

## 2025-01-23 PROCEDURE — 92250 FUNDUS PHOTOGRAPHY W/I&R: CPT | Performed by: OPTOMETRIST

## 2025-01-23 PROCEDURE — 92083 EXTENDED VISUAL FIELD XM: CPT | Performed by: OPTOMETRIST

## 2025-01-23 PROCEDURE — 92012 INTRM OPH EXAM EST PATIENT: CPT | Performed by: OPTOMETRIST

## 2025-01-23 ASSESSMENT — PACHYMETRY
OD_CT_CORRECTION: 1
OS_CT_UM: 566
OS_CT_CORRECTION: -1
OD_CT_UM: 538

## 2025-01-23 ASSESSMENT — TONOMETRY
OS_IOP_MMHG: 15
OD_IOP_MMHG: 15

## 2025-01-23 ASSESSMENT — KERATOMETRY
OD_AXISANGLE_DEGREES: 101
OD_K2POWER_DIOPTERS: 42.00
OD_K1POWER_DIOPTERS: 41.75
OS_AXISANGLE_DEGREES: 084
OS_K2POWER_DIOPTERS: 42.25
OS_K1POWER_DIOPTERS: 42.00

## 2025-01-23 ASSESSMENT — REFRACTION_AUTOREFRACTION
OS_SPHERE: +1.75
OD_AXIS: 067
OD_CYLINDER: -1.50
OD_SPHERE: +1.00
OS_AXIS: 090
OS_CYLINDER: -1.50

## 2025-01-23 ASSESSMENT — REFRACTION_MANIFEST
OD_VA1: 20/40 +1
OD_VA1: 20/50+1
OD_AXIS: 070
OD_SPHERE: +1.25
OS_VA1: 20/30-2
OS_VA1: 20/40 +2
OS_AXIS: 090
OS_SPHERE: +1.75
OD_ADD: +2.75
OD_CYLINDER: -1.50
OS_AXIS: 090
OS_ADD: +2.75
OS_CYLINDER: -1.50
OD_AXIS: 067
OD_CYLINDER: -1.50
OD_SPHERE: +1.00
OS_SPHERE: +1.75
OS_CYLINDER: -1.50

## 2025-01-23 ASSESSMENT — REFRACTION_CURRENTRX
OS_CYLINDER: -1.50
OD_SPHERE: +1.50
OD_AXIS: 080
OD_CYLINDER: -1.50
OD_ADD: +3.00
OD_OVR_VA: 20/
OS_OVR_VA: 20/
OS_AXIS: 091
OS_ADD: +3.00
OS_SPHERE: +1.75

## 2025-01-23 ASSESSMENT — TEAR BREAK UP TIME (TBUT)
OS_TBUT: 2+
OD_TBUT: 2+

## 2025-01-23 ASSESSMENT — VISUAL ACUITY
OD_BCVA: 20/40 +1
OS_BCVA: 20/40 -2

## 2025-04-15 ENCOUNTER — OFFICE (OUTPATIENT)
Facility: LOCATION | Age: 70
Setting detail: OPHTHALMOLOGY
End: 2025-04-15
Payer: COMMERCIAL

## 2025-04-15 DIAGNOSIS — H16.223: ICD-10-CM

## 2025-04-15 DIAGNOSIS — H40.1133: ICD-10-CM

## 2025-04-15 DIAGNOSIS — H25.13: ICD-10-CM

## 2025-04-15 PROCEDURE — 99213 OFFICE O/P EST LOW 20 MIN: CPT | Performed by: OPHTHALMOLOGY

## 2025-04-15 ASSESSMENT — REFRACTION_CURRENTRX
OD_AXIS: 080
OS_AXIS: 091
OS_ADD: +3.00
OS_OVR_VA: 20/
OS_SPHERE: +1.75
OD_ADD: +3.00
OD_CYLINDER: -1.50
OD_OVR_VA: 20/
OS_CYLINDER: -1.50
OD_SPHERE: +1.50

## 2025-04-15 ASSESSMENT — REFRACTION_MANIFEST
OS_AXIS: 090
OD_SPHERE: +1.00
OD_VA1: 20/50
OS_VA1: 20/40+2
OD_VA1: 20/50
OD_AXIS: 066
OD_CYLINDER: -1.75
OS_VA1: 20/40+2
OS_SPHERE: +1.75
OS_CYLINDER: -1.50
OD_SPHERE: +1.00
OD_CYLINDER: -1.75
OS_SPHERE: +1.75
OD_AXIS: 085
OS_CYLINDER: -1.50
OS_AXIS: 090

## 2025-04-15 ASSESSMENT — VISUAL ACUITY
OS_BCVA: 20/40 -2
OD_BCVA: 20/40 +1

## 2025-04-15 ASSESSMENT — REFRACTION_AUTOREFRACTION
OS_CYLINDER: -1.50
OD_AXIS: 066
OS_AXIS: 090
OS_SPHERE: +1.75
OD_CYLINDER: -1.75
OD_SPHERE: +1.00

## 2025-04-15 ASSESSMENT — KERATOMETRY
OD_AXISANGLE_DEGREES: 008
OD_K1POWER_DIOPTERS: 41.25
OD_K2POWER_DIOPTERS: 42.25
OS_K2POWER_DIOPTERS: 42.25
OS_K1POWER_DIOPTERS: 41.50
OS_AXISANGLE_DEGREES: 172

## 2025-04-15 ASSESSMENT — PACHYMETRY
OS_CT_UM: 566
OD_CT_UM: 538
OD_CT_CORRECTION: 1
OS_CT_CORRECTION: -1

## 2025-04-15 ASSESSMENT — TEAR BREAK UP TIME (TBUT)
OD_TBUT: 2+
OS_TBUT: 2+

## 2025-04-15 ASSESSMENT — TONOMETRY
OD_IOP_MMHG: 15
OS_IOP_MMHG: 15

## 2025-05-20 NOTE — ED PROVIDER NOTE - NS ED SCRIBE ACP NAME FT
Subjective   Patient ID: Home Salas is a 62 y.o. male who presents for Medicare Annual Wellness Visit Subsequent (And review labs).    Hypertension  This is a chronic problem. The current episode started more than 1 year ago. The problem is unchanged. The problem is controlled. Pertinent negatives include no palpitations or shortness of breath. There are no associated agents to hypertension. Past treatments include ACE inhibitors and beta blockers. The current treatment provides significant improvement. There are no compliance problems.  Hypertensive end-organ damage includes CVA. There is no history of a hypertension causing med or a thyroid problem.        Review of Systems   Constitutional:  Negative for activity change, appetite change and unexpected weight change.   HENT:  Negative for ear pain, hearing loss, nosebleeds, postnasal drip, rhinorrhea, sinus pressure, sneezing, tinnitus, trouble swallowing and voice change.    Eyes:  Negative for photophobia, pain, discharge, redness, itching and visual disturbance.   Respiratory:  Negative for choking, chest tightness, shortness of breath and wheezing.    Cardiovascular:  Negative for palpitations and leg swelling.   Gastrointestinal:  Negative for abdominal distention, blood in stool, constipation and diarrhea.   Endocrine: Negative for cold intolerance, heat intolerance, polydipsia and polyuria.   Genitourinary:  Negative for dysuria, flank pain, frequency, hematuria and urgency.   Musculoskeletal:  Negative for back pain and neck stiffness.   Skin:  Negative for wound.   Allergic/Immunologic: Negative for immunocompromised state.   Neurological:  Positive for dizziness and speech difficulty.        Chronic expressive aphasia   Chronic vertigo with looking upwards or if he lies flat on his back   Hematological:  Negative for adenopathy. Does not bruise/bleed easily.   Psychiatric/Behavioral:  Negative for agitation, behavioral problems, dysphoric mood,  hallucinations, self-injury, sleep disturbance and suicidal ideas.        Objective   /88 (BP Location: Left arm, Patient Position: Sitting, BP Cuff Size: Large adult)   Pulse 68   Temp 36.3 °C (97.4 °F) (Temporal)   Resp 16   Ht 1.829 m (6')   Wt 105 kg (231 lb 8 oz)   SpO2 92%   BMI 31.40 kg/m²     Physical Exam  Constitutional:       General: He is not in acute distress.     Appearance: He is not ill-appearing or diaphoretic.   HENT:      Head: Normocephalic and atraumatic.      Right Ear: External ear normal.      Left Ear: External ear normal.      Nose: Nose normal. No rhinorrhea.   Eyes:      General: Lids are normal. No scleral icterus.        Right eye: No discharge.         Left eye: No discharge.      Conjunctiva/sclera: Conjunctivae normal.   Cardiovascular:      Rate and Rhythm: Normal rate and regular rhythm.      Pulses: Normal pulses.      Heart sounds: No murmur heard.  Pulmonary:      Effort: Pulmonary effort is normal. No respiratory distress.      Breath sounds: No decreased breath sounds, wheezing, rhonchi or rales.   Abdominal:      General: Bowel sounds are normal. There is no distension.      Palpations: Abdomen is soft. There is no mass.      Tenderness: There is no abdominal tenderness. There is no guarding or rebound.   Musculoskeletal:         General: No swelling, tenderness or deformity.      Cervical back: No rigidity or tenderness.      Right lower leg: No edema.      Left lower leg: No edema.   Lymphadenopathy:      Cervical: No cervical adenopathy.      Upper Body:      Right upper body: No supraclavicular adenopathy.      Left upper body: No supraclavicular adenopathy.   Skin:     General: Skin is warm and dry.      Coloration: Skin is not jaundiced or pale.   Neurological:      General: No focal deficit present.      Mental Status: He is alert and oriented to person, place, and time.      Cranial Nerves: Dysarthria present.      Sensory: No sensory deficit.       Motor: No weakness or tremor.      Coordination: Romberg sign negative. Coordination normal.      Gait: Gait normal.      Comments: Expressive aphasia   Psychiatric:         Mood and Affect: Mood normal. Affect is not inappropriate.         Behavior: Behavior normal.         Assessment/Plan   Diagnoses and all orders for this visit:  Routine general medical examination at health care facility  -     Follow Up In [facility] - PCP - Medicare Annual  -     Follow Up In [facility] - PCP - Medicare Annual; Future  Primary hypertension  -     Follow Up In [facility] - PCP - Medicare Annual  -     carvedilol (Coreg) 3.125 mg tablet; Take 1 tablet (3.125 mg) by mouth 2 times daily (morning and late afternoon).  -     CBC and Auto Differential; Future  -     Comprehensive Metabolic Panel; Future  -     Lipid Panel; Future  -     Magnesium; Future  -     TSH with reflex to Free T4 if abnormal; Future  -     Follow Up In [facility] - PCP - Medicare Annual; Future  Need for vaccination  -     Follow Up In [facility] - PCP - Medicare Annual  -     Follow Up In [facility] - PCP - Medicare Annual; Future  Chronic left-sided low back pain without sciatica  -     Follow Up In [facility] - PCP - Medicare Annual  -     Follow Up In [facility] - PCP - Medicare Annual; Future  Advanced directives, counseling/discussion  -     Follow Up In [facility] - PCP - Medicare Annual  -     Full code  -     Follow Up In [facility] - PCP - Medicare Annual; Future  Chronic obstructive pulmonary disease, unspecified COPD type (Multi)  -     Follow Up In [facility] - PCP - Medicare Annual  -     CBC and Auto Differential; Future  -     Comprehensive Metabolic Panel; Future  -     Lipid Panel; Future  -     Magnesium; Future  -     TSH with reflex to Free T4 if abnormal; Future  -     Follow Up In [facility]  Medicare Annual; Future  Benign prostatic hyperplasia with urinary frequency  -     tamsulosin (Flomax) 0.4 mg 24 hr capsule; Take 1 capsule (0.4 mg) by mouth once daily.  -     Follow Up In [facility] - PCP - Medicare Annual; Future  Class 1 obesity due to excess calories with serious comorbidity and body mass index (BMI) of 31.0 to 31.9 in adult  -     CBC and Auto Differential; Future  -     Comprehensive Metabolic Panel; Future  -     Lipid Panel; Future  -     Magnesium; Future  -     TSH with reflex to Free T4 if abnormal; Future  -     Follow Up In [facility] - PCP - Medicare Annual; Future  Former cigarette smoker  -     CT lung screening low dose; Future  -     varenicline tartrate (Chantix) 1 mg tablet; Take 1 tablet (1 mg) by mouth 2 times a day. Take with full glass of water.  -     Follow Up In [facility] - PCP - Medicare Annual; Future  Malignant neoplasm of prostate (Multi)  -     Follow Up In [facility] - PCP - Medicare Annual; Future    Advance care planning was discussed including living will, power of  for healthcare and living will.  Advance care planning packet will be provided to patient at discharge.  Patient was encouraged to bring in any advanced care planning paperwork to file on the chart at their own convenience.  (16min spent discussing above)     ROHITH Burks

## 2025-07-29 ENCOUNTER — RX ONLY (RX ONLY)
Age: 70
End: 2025-07-29

## 2025-07-29 ENCOUNTER — OFFICE (OUTPATIENT)
Facility: LOCATION | Age: 70
Setting detail: OPHTHALMOLOGY
End: 2025-07-29
Payer: COMMERCIAL

## 2025-07-29 DIAGNOSIS — H25.13: ICD-10-CM

## 2025-07-29 DIAGNOSIS — H25.11: ICD-10-CM

## 2025-07-29 PROCEDURE — 92136 OPHTHALMIC BIOMETRY: CPT | Mod: TC | Performed by: OPHTHALMOLOGY

## 2025-07-29 PROCEDURE — 92136 OPHTHALMIC BIOMETRY: CPT | Mod: RT | Performed by: OPHTHALMOLOGY

## 2025-07-29 PROCEDURE — 99214 OFFICE O/P EST MOD 30 MIN: CPT | Performed by: OPHTHALMOLOGY

## 2025-07-29 ASSESSMENT — REFRACTION_CURRENTRX
OD_SPHERE: +1.50
OD_OVR_VA: 20/
OS_AXIS: 091
OD_CYLINDER: -1.50
OD_AXIS: 080
OS_CYLINDER: -1.50
OD_ADD: +3.00
OS_SPHERE: +1.75
OS_ADD: +3.00
OS_OVR_VA: 20/

## 2025-07-29 ASSESSMENT — REFRACTION_AUTOREFRACTION
OS_AXIS: 113
OD_SPHERE: +1.00
OD_CYLINDER: -1.50
OS_CYLINDER: -1.50
OD_AXIS: 064
OS_SPHERE: +1.75

## 2025-07-29 ASSESSMENT — PACHYMETRY
OD_CT_CORRECTION: 1
OD_CT_UM: 538
OS_CT_UM: 566
OS_CT_CORRECTION: -1

## 2025-07-29 ASSESSMENT — REFRACTION_MANIFEST
OD_SPHERE: +1.00
OS_CYLINDER: -1.50
OD_AXIS: 064
OD_CYLINDER: -1.50
OS_VA1: 20/30
OD_VA1: 20/50
OD_SPHERE: +1.00
OS_AXIS: 113
OD_AXIS: 064
OS_CYLINDER: -1.50
OD_VA1: 20/50+2
OS_SPHERE: +1.75
OS_VA1: 20/40+2
OS_SPHERE: +1.75
OD_CYLINDER: -1.50
OS_AXIS: 113

## 2025-07-29 ASSESSMENT — KERATOMETRY
OS_K2POWER_DIOPTERS: 42.25
OD_K1K2_AVERAGE: 41.75
OS_K1POWER_DIOPTERS: 41.50
OD_CYLPOWER_DEGREES: 1
OD_AXISANGLE2_DEGREES: 008
OS_CYLAXISANGLE_DEGREES: 172
OS_K2POWER_DIOPTERS: 42.25
OD_AXISANGLE_DEGREES: 98
OD_AXISANGLE_DEGREES: 106
OS_AXISANGLE2_DEGREES: 172
OD_K1POWER_DIOPTERS: 41.50
OD_K2POWER_DIOPTERS: 42.00
OD_K2POWER_DIOPTERS: 42.25
OS_AXISANGLE_DEGREES: 066
OS_CYLPOWER_DEGREES: 0.75
OD_CYLAXISANGLE_DEGREES: 008
OS_K1K2_AVERAGE: 41.875
OS_K1POWER_DIOPTERS: 41.50
OD_K1POWER_DIOPTERS: 41.25
OS_AXISANGLE_DEGREES: 82

## 2025-07-29 ASSESSMENT — TEAR BREAK UP TIME (TBUT)
OD_TBUT: 2+
OS_TBUT: 2+

## 2025-07-29 ASSESSMENT — TONOMETRY
OS_IOP_MMHG: 12
OD_IOP_MMHG: 12

## 2025-07-29 ASSESSMENT — VISUAL ACUITY
OD_BCVA: 20/40 +1
OS_BCVA: 20/40 -2